# Patient Record
Sex: FEMALE | Race: WHITE | NOT HISPANIC OR LATINO | URBAN - METROPOLITAN AREA
[De-identification: names, ages, dates, MRNs, and addresses within clinical notes are randomized per-mention and may not be internally consistent; named-entity substitution may affect disease eponyms.]

---

## 2017-06-21 ENCOUNTER — APPOINTMENT (OUTPATIENT)
Dept: WOMENS IMAGING | Facility: HOSPITAL | Age: 42
End: 2017-06-21

## 2017-06-21 PROCEDURE — 77067 SCR MAMMO BI INCL CAD: CPT | Performed by: RADIOLOGY

## 2017-08-17 DIAGNOSIS — Z00.00 HEALTH CARE MAINTENANCE: Primary | ICD-10-CM

## 2017-08-19 LAB
25(OH)D3+25(OH)D2 SERPL-MCNC: 30.1 NG/ML (ref 30–100)
ALBUMIN SERPL-MCNC: 4.6 G/DL (ref 3.5–5.2)
ALBUMIN/GLOB SERPL: 1.8 G/DL
ALP SERPL-CCNC: 50 U/L (ref 39–117)
ALT SERPL-CCNC: 14 U/L (ref 1–33)
APPEARANCE UR: CLEAR
AST SERPL-CCNC: 18 U/L (ref 1–32)
BACTERIA #/AREA URNS HPF: NORMAL /HPF
BASOPHILS # BLD AUTO: 0.04 10*3/MM3 (ref 0–0.2)
BASOPHILS NFR BLD AUTO: 0.6 % (ref 0–1.5)
BILIRUB SERPL-MCNC: 1 MG/DL (ref 0.1–1.2)
BILIRUB UR QL STRIP: NEGATIVE
BUN SERPL-MCNC: 9 MG/DL (ref 6–20)
BUN/CREAT SERPL: 11.4 (ref 7–25)
CALCIUM SERPL-MCNC: 9.7 MG/DL (ref 8.6–10.5)
CHLORIDE SERPL-SCNC: 99 MMOL/L (ref 98–107)
CHOLEST SERPL-MCNC: 166 MG/DL (ref 0–200)
CO2 SERPL-SCNC: 26.6 MMOL/L (ref 22–29)
COLOR UR: YELLOW
CREAT SERPL-MCNC: 0.79 MG/DL (ref 0.57–1)
EOSINOPHIL # BLD AUTO: 0.18 10*3/MM3 (ref 0–0.7)
EOSINOPHIL NFR BLD AUTO: 2.8 % (ref 0.3–6.2)
EPI CELLS #/AREA URNS HPF: NORMAL /HPF
ERYTHROCYTE [DISTWIDTH] IN BLOOD BY AUTOMATED COUNT: 12.3 % (ref 11.7–13)
GLOBULIN SER CALC-MCNC: 2.5 GM/DL
GLUCOSE SERPL-MCNC: 78 MG/DL (ref 65–99)
GLUCOSE UR QL: NEGATIVE
HBA1C MFR BLD: 5.2 % (ref 4.8–5.6)
HCT VFR BLD AUTO: 42.3 % (ref 35.6–45.5)
HDLC SERPL-MCNC: 70 MG/DL (ref 40–60)
HGB BLD-MCNC: 14.2 G/DL (ref 11.9–15.5)
HGB UR QL STRIP: NEGATIVE
IMM GRANULOCYTES # BLD: 0 10*3/MM3 (ref 0–0.03)
IMM GRANULOCYTES NFR BLD: 0 % (ref 0–0.5)
KETONES UR QL STRIP: NEGATIVE
LDLC SERPL CALC-MCNC: 68 MG/DL (ref 0–100)
LEUKOCYTE ESTERASE UR QL STRIP: NEGATIVE
LYMPHOCYTES # BLD AUTO: 1.78 10*3/MM3 (ref 0.9–4.8)
LYMPHOCYTES NFR BLD AUTO: 27.4 % (ref 19.6–45.3)
MCH RBC QN AUTO: 30.8 PG (ref 26.9–32)
MCHC RBC AUTO-ENTMCNC: 33.6 G/DL (ref 32.4–36.3)
MCV RBC AUTO: 91.8 FL (ref 80.5–98.2)
MICRO URNS: NORMAL
MICRO URNS: NORMAL
MONOCYTES # BLD AUTO: 0.44 10*3/MM3 (ref 0.2–1.2)
MONOCYTES NFR BLD AUTO: 6.8 % (ref 5–12)
MUCOUS THREADS URNS QL MICRO: PRESENT /HPF
NEUTROPHILS # BLD AUTO: 4.05 10*3/MM3 (ref 1.9–8.1)
NEUTROPHILS NFR BLD AUTO: 62.4 % (ref 42.7–76)
NITRITE UR QL STRIP: NEGATIVE
PH UR STRIP: 6.5 [PH] (ref 5–7.5)
PLATELET # BLD AUTO: 258 10*3/MM3 (ref 140–500)
POTASSIUM SERPL-SCNC: 4 MMOL/L (ref 3.5–5.2)
PROT SERPL-MCNC: 7.1 G/DL (ref 6–8.5)
PROT UR QL STRIP: NEGATIVE
RBC # BLD AUTO: 4.61 10*6/MM3 (ref 3.9–5.2)
RBC #/AREA URNS HPF: NORMAL /HPF
SODIUM SERPL-SCNC: 138 MMOL/L (ref 136–145)
SP GR UR: 1.01 (ref 1–1.03)
TRIGL SERPL-MCNC: 142 MG/DL (ref 0–150)
TSH SERPL DL<=0.005 MIU/L-ACNC: 2.39 MIU/ML (ref 0.27–4.2)
URINALYSIS REFLEX: NORMAL
UROBILINOGEN UR STRIP-MCNC: 0.2 MG/DL (ref 0.2–1)
VLDLC SERPL CALC-MCNC: 28.4 MG/DL (ref 5–40)
WBC # BLD AUTO: 6.49 10*3/MM3 (ref 4.5–10.7)
WBC #/AREA URNS HPF: NORMAL /HPF

## 2017-08-24 ENCOUNTER — OFFICE VISIT (OUTPATIENT)
Dept: INTERNAL MEDICINE | Facility: CLINIC | Age: 42
End: 2017-08-24

## 2017-08-24 VITALS
BODY MASS INDEX: 19.63 KG/M2 | WEIGHT: 115 LBS | OXYGEN SATURATION: 98 % | HEIGHT: 64 IN | HEART RATE: 75 BPM | RESPIRATION RATE: 18 BRPM | SYSTOLIC BLOOD PRESSURE: 104 MMHG | DIASTOLIC BLOOD PRESSURE: 68 MMHG

## 2017-08-24 DIAGNOSIS — Z00.00 HEALTH MAINTENANCE EXAMINATION: Primary | ICD-10-CM

## 2017-08-24 PROCEDURE — 99396 PREV VISIT EST AGE 40-64: CPT | Performed by: INTERNAL MEDICINE

## 2017-08-24 NOTE — PROGRESS NOTES
Chief Complaint  Carmen Francois is a 42 y.o. female who presents for Annual Exam (CPE)  .    History of Present Illness     Mammo:2016, this summer 2017 at [facility]  Pap:, this year at [facility].      Exercise: daily    Immunization History   Administered Date(s) Administered   • Influenza, Quadrivalent 10/15/2014   • Tdap 10/01/2010, 2016       Review of Systems   Constitution: Negative for chills, fever and malaise/fatigue.   HENT: Negative for headaches, hearing loss, hoarse voice and sore throat.    Eyes: Negative for blurred vision, double vision and visual disturbance.   Cardiovascular: Negative for chest pain, leg swelling and palpitations.   Respiratory: Negative for cough and shortness of breath.    Endocrine: Negative for polydipsia and polyuria.   Hematologic/Lymphatic: Does not bruise/bleed easily.   Skin: Negative for rash and suspicious lesions.   Musculoskeletal: Negative for arthritis and myalgias.   Gastrointestinal: Negative for bloating, abdominal pain, change in bowel habit, constipation, diarrhea, dysphagia, hematochezia, melena, nausea and vomiting.   Genitourinary: Negative for dysuria and hematuria.   Neurological: Negative for dizziness and light-headedness.   Psychiatric/Behavioral: Negative for depression. The patient is not nervous/anxious.        There is no problem list on file for this patient.      Past Medical History:   Diagnosis Date   • History of EKG 2014   • Skin lesion        Past Surgical History:   Procedure Laterality Date   •  SECTION     • COLONOSCOPY  2014       Family History   Problem Relation Age of Onset   • Depression Mother    • Lung cancer Paternal Grandmother    • Breast cancer Maternal Grandmother        Social History     Social History   • Marital status:      Spouse name: N/A   • Number of children: N/A   • Years of education: N/A     Occupational History   • Not on file.     Social History Main Topics   •  "Smoking status: Never Smoker   • Smokeless tobacco: Never Used   • Alcohol use Yes      Comment: occasionally   • Drug use: No   • Sexual activity: Defer     Other Topics Concern   • Not on file     Social History Narrative       Current Outpatient Prescriptions on File Prior to Visit   Medication Sig Dispense Refill   • Multiple Vitamin (MULTIVITAMINS PO) Take  by mouth Daily.       No current facility-administered medications on file prior to visit.        No Known Allergies    Vitals:    08/24/17 1104   BP: 104/68   Pulse: 75   Resp: 18   SpO2: 98%   Weight: 115 lb (52.2 kg)   Height: 64\" (162.6 cm)       Body mass index is 19.74 kg/(m^2).    Objective   Physical Exam   Constitutional: She is oriented to person, place, and time. She appears well-developed and well-nourished. No distress.   HENT:   Head: Normocephalic and atraumatic.   Nose: Nose normal.   Mouth/Throat: Oropharynx is clear and moist.   Eyes: Conjunctivae and EOM are normal. Pupils are equal, round, and reactive to light. No scleral icterus.   Neck: Normal range of motion. Neck supple. No thyromegaly present.   Cardiovascular: Normal rate, regular rhythm and normal heart sounds.  Exam reveals no gallop and no friction rub.    No murmur heard.  Pulses:       Carotid pulses are 2+ on the right side, and 2+ on the left side.       Femoral pulses are 2+ on the right side, and 2+ on the left side.       Dorsalis pedis pulses are 2+ on the right side, and 2+ on the left side.        Posterior tibial pulses are 2+ on the right side, and 2+ on the left side.   Pulmonary/Chest: Effort normal and breath sounds normal. No respiratory distress. She has no wheezes. She has no rales. Right breast exhibits no mass and no nipple discharge. Left breast exhibits no mass and no nipple discharge.   Abdominal: Soft. Bowel sounds are normal. She exhibits no distension and no mass. There is no tenderness.   Musculoskeletal: Normal range of motion. She exhibits no edema. "       Vascular Status -  Her exam exhibits no right foot edema. Her exam exhibits no left foot edema.   Skin Integrity  -  Her right foot skin is intact.     Carmen 's left foot skin is intact. .  Lymphadenopathy:     She has no cervical adenopathy.     She has no axillary adenopathy.        Right: No inguinal and no supraclavicular adenopathy present.        Left: No inguinal and no supraclavicular adenopathy present.   Neurological: She is alert and oriented to person, place, and time. She has normal reflexes. No cranial nerve deficit.   Skin: Skin is warm and dry.   Psychiatric: She has a normal mood and affect. Her speech is normal and behavior is normal. Judgment and thought content normal. Cognition and memory are normal.   Vitals reviewed.        Results for orders placed or performed in visit on 08/17/17   Comprehensive Metabolic Panel   Result Value Ref Range    Glucose 78 65 - 99 mg/dL    BUN 9 6 - 20 mg/dL    Creatinine 0.79 0.57 - 1.00 mg/dL    eGFR Non African Am 79 >60 mL/min/1.73    eGFR African Am 96 >60 mL/min/1.73    BUN/Creatinine Ratio 11.4 7.0 - 25.0    Sodium 138 136 - 145 mmol/L    Potassium 4.0 3.5 - 5.2 mmol/L    Chloride 99 98 - 107 mmol/L    Total CO2 26.6 22.0 - 29.0 mmol/L    Calcium 9.7 8.6 - 10.5 mg/dL    Total Protein 7.1 6.0 - 8.5 g/dL    Albumin 4.60 3.50 - 5.20 g/dL    Globulin 2.5 gm/dL    A/G Ratio 1.8 g/dL    Total Bilirubin 1.0 0.1 - 1.2 mg/dL    Alkaline Phosphatase 50 39 - 117 U/L    AST (SGOT) 18 1 - 32 U/L    ALT (SGPT) 14 1 - 33 U/L   Lipid Panel   Result Value Ref Range    Total Cholesterol 166 0 - 200 mg/dL    Triglycerides 142 0 - 150 mg/dL    HDL Cholesterol 70 (H) 40 - 60 mg/dL    VLDL Cholesterol 28.4 5 - 40 mg/dL    LDL Cholesterol  68 0 - 100 mg/dL   TSH Rfx On Abnormal To Free T4   Result Value Ref Range    TSH 2.39 0.27 - 4.2 mIU/mL   UA / M With / Rflx Culture([facility] ONLY)   Result Value Ref Range    Specific Gravity, UA 1.006 1.005 - 1.030    pH, UA 6.5 5.0  - 7.5    Color, UA Yellow Yellow    Appearance, UA Clear Clear    Leukocytes, UA Negative Negative    Protein Negative Negative/Trace    Glucose, UA Negative Negative    Ketones Negative Negative    Blood, UA Negative Negative    Bilirubin, UA Negative Negative    Urobilinogen, UA 0.2 0.2 - 1.0 mg/dL    Nitrite, UA Negative Negative    Microscopic Examination Comment     MICROSCOPIC EXAMINATION See below:     Urinalysis Reflex Comment    Vitamin D 25 Hydroxy   Result Value Ref Range    25 Hydroxy, Vitamin D 30.1 30.0 - 100.0 ng/mL   Hemoglobin A1c   Result Value Ref Range    Hemoglobin A1C 5.20 4.80 - 5.60 %   Microscopic Examination   Result Value Ref Range    WBC, UA None seen 0 - 5 /hpf    RBC, UA 0-2 0 - 2 /hpf    Epithelial Cells (non renal) 0-10 0 - 10 /hpf    Mucus, UA Present Not Estab. /hpf    Bacteria, UA Few None seen/Few /hpf   CBC & Differential   Result Value Ref Range    WBC 6.49 4.50 - 10.70 10*3/mm3    RBC 4.61 3.90 - 5.20 10*6/mm3    Hemoglobin 14.2 11.9 - 15.5 g/dL    Hematocrit 42.3 35.6 - 45.5 %    MCV 91.8 80.5 - 98.2 fL    MCH 30.8 26.9 - 32.0 pg    MCHC 33.6 32.4 - 36.3 g/dL    RDW 12.3 11.7 - 13.0 %    Platelets 258 140 - 500 10*3/mm3    Neutrophil Rel % 62.4 42.7 - 76.0 %    Lymphocyte Rel % 27.4 19.6 - 45.3 %    Monocyte Rel % 6.8 5.0 - 12.0 %    Eosinophil Rel % 2.8 0.3 - 6.2 %    Basophil Rel % 0.6 0.0 - 1.5 %    Neutrophils Absolute 4.05 1.90 - 8.10 10*3/mm3    Lymphocytes Absolute 1.78 0.90 - 4.80 10*3/mm3    Monocytes Absolute 0.44 0.20 - 1.20 10*3/mm3    Eosinophils Absolute 0.18 0.00 - 0.70 10*3/mm3    Basophils Absolute 0.04 0.00 - 0.20 10*3/mm3    Immature Granulocyte Rel % 0.0 0.0 - 0.5 %    Immature Grans Absolute 0.00 0.00 - 0.03 10*3/mm3       Assessment/Plan   Diagnoses and all orders for this visit:    Health maintenance examination        Discussion/Summary    Rekha is a very healthy 41 y/o here for routine CPE.  She is up to date on health maintenance.  Her labs all look  good.  I will see her back in a year.      Return in about 1 year (around 8/24/2018) for Annual physical, with fasting labs prior.

## 2018-08-17 DIAGNOSIS — Z00.00 HEALTH CARE MAINTENANCE: Primary | ICD-10-CM

## 2018-08-22 LAB
25(OH)D3+25(OH)D2 SERPL-MCNC: 29.6 NG/ML (ref 30–100)
ALBUMIN SERPL-MCNC: 4.5 G/DL (ref 3.5–5.5)
ALBUMIN/GLOB SERPL: 2 {RATIO} (ref 1.2–2.2)
ALP SERPL-CCNC: 57 IU/L (ref 39–117)
ALT SERPL-CCNC: 11 IU/L (ref 0–32)
APPEARANCE UR: CLEAR
AST SERPL-CCNC: 16 IU/L (ref 0–40)
BACTERIA #/AREA URNS HPF: NORMAL /[HPF]
BASOPHILS # BLD AUTO: 0.1 X10E3/UL (ref 0–0.2)
BASOPHILS NFR BLD AUTO: 1 %
BILIRUB SERPL-MCNC: 0.8 MG/DL (ref 0–1.2)
BILIRUB UR QL STRIP: NEGATIVE
BUN SERPL-MCNC: 11 MG/DL (ref 6–24)
BUN/CREAT SERPL: 14 (ref 9–23)
CALCIUM SERPL-MCNC: 9.3 MG/DL (ref 8.7–10.2)
CHLORIDE SERPL-SCNC: 99 MMOL/L (ref 96–106)
CHOLEST SERPL-MCNC: 172 MG/DL (ref 100–199)
CO2 SERPL-SCNC: 24 MMOL/L (ref 20–29)
COLOR UR: YELLOW
CREAT SERPL-MCNC: 0.77 MG/DL (ref 0.57–1)
EOSINOPHIL # BLD AUTO: 0.2 X10E3/UL (ref 0–0.4)
EOSINOPHIL NFR BLD AUTO: 3 %
EPI CELLS #/AREA URNS HPF: NORMAL /HPF
ERYTHROCYTE [DISTWIDTH] IN BLOOD BY AUTOMATED COUNT: 12.6 % (ref 12.3–15.4)
GLOBULIN SER CALC-MCNC: 2.3 G/DL (ref 1.5–4.5)
GLUCOSE SERPL-MCNC: 86 MG/DL (ref 65–99)
GLUCOSE UR QL: NEGATIVE
HBA1C MFR BLD: 5.1 % (ref 4.8–5.6)
HCT VFR BLD AUTO: 40.6 % (ref 34–46.6)
HDLC SERPL-MCNC: 70 MG/DL
HGB BLD-MCNC: 13.9 G/DL (ref 11.1–15.9)
HGB UR QL STRIP: NEGATIVE
IMM GRANULOCYTES # BLD: 0 X10E3/UL (ref 0–0.1)
IMM GRANULOCYTES NFR BLD: 0 %
KETONES UR QL STRIP: NEGATIVE
LDLC SERPL CALC-MCNC: 77 MG/DL (ref 0–99)
LEUKOCYTE ESTERASE UR QL STRIP: NEGATIVE
LYMPHOCYTES # BLD AUTO: 1.7 X10E3/UL (ref 0.7–3.1)
LYMPHOCYTES NFR BLD AUTO: 31 %
MCH RBC QN AUTO: 30.7 PG (ref 26.6–33)
MCHC RBC AUTO-ENTMCNC: 34.2 G/DL (ref 31.5–35.7)
MCV RBC AUTO: 90 FL (ref 79–97)
MICRO URNS: NORMAL
MICRO URNS: NORMAL
MONOCYTES # BLD AUTO: 0.4 X10E3/UL (ref 0.1–0.9)
MONOCYTES NFR BLD AUTO: 8 %
NEUTROPHILS # BLD AUTO: 3 X10E3/UL (ref 1.4–7)
NEUTROPHILS NFR BLD AUTO: 57 %
NITRITE UR QL STRIP: NEGATIVE
PH UR STRIP: 6.5 [PH] (ref 5–7.5)
PLATELET # BLD AUTO: 273 X10E3/UL (ref 150–379)
POTASSIUM SERPL-SCNC: 4.5 MMOL/L (ref 3.5–5.2)
PROT SERPL-MCNC: 6.8 G/DL (ref 6–8.5)
PROT UR QL STRIP: NEGATIVE
RBC # BLD AUTO: 4.53 X10E6/UL (ref 3.77–5.28)
RBC #/AREA URNS HPF: NORMAL /HPF
SODIUM SERPL-SCNC: 139 MMOL/L (ref 134–144)
SP GR UR: 1.01 (ref 1–1.03)
TRIGL SERPL-MCNC: 125 MG/DL (ref 0–149)
TSH SERPL DL<=0.005 MIU/L-ACNC: 3.36 UIU/ML (ref 0.45–4.5)
URINALYSIS REFLEX: NORMAL
UROBILINOGEN UR STRIP-MCNC: 0.2 MG/DL (ref 0.2–1)
VLDLC SERPL CALC-MCNC: 25 MG/DL (ref 5–40)
WBC # BLD AUTO: 5.3 X10E3/UL (ref 3.4–10.8)
WBC #/AREA URNS HPF: NORMAL /HPF

## 2018-08-28 ENCOUNTER — OFFICE VISIT (OUTPATIENT)
Dept: INTERNAL MEDICINE | Facility: CLINIC | Age: 43
End: 2018-08-28

## 2018-08-28 VITALS
HEIGHT: 64 IN | RESPIRATION RATE: 18 BRPM | HEART RATE: 88 BPM | DIASTOLIC BLOOD PRESSURE: 64 MMHG | OXYGEN SATURATION: 99 % | BODY MASS INDEX: 19.81 KG/M2 | WEIGHT: 116 LBS | SYSTOLIC BLOOD PRESSURE: 98 MMHG

## 2018-08-28 DIAGNOSIS — Z00.00 HEALTH MAINTENANCE EXAMINATION: Primary | ICD-10-CM

## 2018-08-28 PROCEDURE — 99396 PREV VISIT EST AGE 40-64: CPT | Performed by: INTERNAL MEDICINE

## 2018-08-28 NOTE — PROGRESS NOTES
Chief Complaint  Carmen Francois is a 43 y.o. female who presents for Annual Exam (CPE)  .    History of Present Illness   Carmen is here for routine CPE.  No new concerns.      Mammo: Is due this year, knows she is due. Scheduled in a couple weeks.   Pap: , gets this same time as mammo.   She had a c-scope at 39 in  with Dr. Andrea.    Exercise: Walks her dog daily, jogs sometimes.     Immunization History   Administered Date(s) Administered   • Hepatitis A 2018   • Influenza, Quadrivalent 10/15/2014   • Tdap 10/01/2010, 2016       Review of Systems   Constitution: Negative for chills, fever and malaise/fatigue.   HENT: Negative for hearing loss, hoarse voice and sore throat.    Eyes: Negative for blurred vision, double vision and visual disturbance.   Cardiovascular: Negative for chest pain, leg swelling and palpitations.   Respiratory: Negative for cough and shortness of breath.    Endocrine: Negative for polydipsia and polyuria.   Hematologic/Lymphatic: Does not bruise/bleed easily.   Skin: Negative for rash and suspicious lesions.   Musculoskeletal: Negative for arthritis and myalgias.   Gastrointestinal: Negative for bloating, abdominal pain, change in bowel habit, constipation, diarrhea, dysphagia, hematochezia, melena, nausea and vomiting.   Genitourinary: Negative for dysuria and hematuria.   Neurological: Negative for dizziness, headaches and light-headedness.   Psychiatric/Behavioral: Negative for depression. The patient is not nervous/anxious.        There is no problem list on file for this patient.      Past Medical History:   Diagnosis Date   • History of EKG 2014   • Skin lesion        Past Surgical History:   Procedure Laterality Date   •  SECTION     • COLONOSCOPY  2014       Family History   Problem Relation Age of Onset   • Depression Mother    • Lung cancer Paternal Grandmother    • Breast cancer Maternal Grandmother        Social History  "    Social History   • Marital status:      Spouse name: N/A   • Number of children: 2   • Years of education: N/A     Occupational History   • Not on file.     Social History Main Topics   • Smoking status: Never Smoker   • Smokeless tobacco: Never Used   • Alcohol use Yes      Comment: occasionally   • Drug use: No   • Sexual activity: Defer     Other Topics Concern   • Not on file     Social History Narrative   • No narrative on file       Current Outpatient Prescriptions on File Prior to Visit   Medication Sig Dispense Refill   • Multiple Vitamin (MULTIVITAMINS PO) Take  by mouth Daily.       No current facility-administered medications on file prior to visit.        No Known Allergies    Vitals:    08/28/18 1258   BP: 98/64   Pulse: 88   Resp: 18   SpO2: 99%   Weight: 52.6 kg (116 lb)   Height: 162.6 cm (64.02\")       Body mass index is 19.9 kg/m².    Objective   Physical Exam   Constitutional: She is oriented to person, place, and time. She appears well-developed and well-nourished. No distress.   HENT:   Head: Normocephalic and atraumatic.   Nose: Nose normal.   Mouth/Throat: Oropharynx is clear and moist.   Eyes: Pupils are equal, round, and reactive to light. Conjunctivae and EOM are normal. No scleral icterus.   Neck: Normal range of motion. Neck supple. No thyromegaly present.   Cardiovascular: Normal rate, regular rhythm and normal heart sounds.  Exam reveals no gallop and no friction rub.    No murmur heard.  Pulses:       Carotid pulses are 2+ on the right side, and 2+ on the left side.       Femoral pulses are 2+ on the right side, and 2+ on the left side.       Dorsalis pedis pulses are 2+ on the right side, and 2+ on the left side.        Posterior tibial pulses are 2+ on the right side, and 2+ on the left side.   Pulmonary/Chest: Effort normal and breath sounds normal. No respiratory distress. She has no wheezes. She has no rales. Right breast exhibits no mass and no nipple discharge. Left " breast exhibits no mass and no nipple discharge.   Abdominal: Soft. Bowel sounds are normal. She exhibits no distension and no mass. There is no tenderness.   Musculoskeletal: Normal range of motion. She exhibits no edema.     Vascular Status -  Her right foot exhibits no edema. Her left foot exhibits no edema.  Skin Integrity  -  Her right foot skin is intact.Her left foot skin is intact..  Lymphadenopathy:     She has no cervical adenopathy.     She has no axillary adenopathy.        Right: No inguinal and no supraclavicular adenopathy present.        Left: No inguinal and no supraclavicular adenopathy present.   Neurological: She is alert and oriented to person, place, and time. She has normal reflexes. No cranial nerve deficit.   Skin: Skin is warm and dry.   Psychiatric: She has a normal mood and affect. Her speech is normal and behavior is normal. Judgment and thought content normal. Cognition and memory are normal.   Vitals reviewed.        Results for orders placed or performed in visit on 08/17/18   Comprehensive Metabolic Panel   Result Value Ref Range    Glucose 86 65 - 99 mg/dL    BUN 11 6 - 24 mg/dL    Creatinine 0.77 0.57 - 1.00 mg/dL    eGFR Non African Am 94 >59 mL/min/1.73    eGFR African Am 109 >59 mL/min/1.73    BUN/Creatinine Ratio 14 9 - 23    Sodium 139 134 - 144 mmol/L    Potassium 4.5 3.5 - 5.2 mmol/L    Chloride 99 96 - 106 mmol/L    Total CO2 24 20 - 29 mmol/L    Calcium 9.3 8.7 - 10.2 mg/dL    Total Protein 6.8 6.0 - 8.5 g/dL    Albumin 4.5 3.5 - 5.5 g/dL    Globulin 2.3 1.5 - 4.5 g/dL    A/G Ratio 2.0 1.2 - 2.2    Total Bilirubin 0.8 0.0 - 1.2 mg/dL    Alkaline Phosphatase 57 39 - 117 IU/L    AST (SGOT) 16 0 - 40 IU/L    ALT (SGPT) 11 0 - 32 IU/L   Lipid Panel   Result Value Ref Range    Total Cholesterol 172 100 - 199 mg/dL    Triglycerides 125 0 - 149 mg/dL    HDL Cholesterol 70 >39 mg/dL    VLDL Cholesterol 25 5 - 40 mg/dL    LDL Cholesterol  77 0 - 99 mg/dL   TSH Rfx On Abnormal To  Free T4   Result Value Ref Range    TSH 3.360 0.450 - 4.500 uIU/mL   Vitamin D 25 Hydroxy   Result Value Ref Range    25 Hydroxy, Vitamin D 29.6 (L) 30.0 - 100.0 ng/mL   Hemoglobin A1c   Result Value Ref Range    Hemoglobin A1C 5.1 4.8 - 5.6 %   Urinalysis With Culture If Indicated - Urine, Clean Catch   Result Value Ref Range    Specific Gravity, UA 1.011 1.005 - 1.030    pH, UA 6.5 5.0 - 7.5    Color, UA Yellow Yellow    Appearance, UA Clear Clear    Leukocytes, UA Negative Negative    Protein Negative Negative/Trace    Glucose, UA Negative Negative    Ketones Negative Negative    Blood, UA Negative Negative    Bilirubin, UA Negative Negative    Urobilinogen, UA 0.2 0.2 - 1.0 mg/dL    Nitrite, UA Negative Negative    Microscopic Examination Comment     MICROSCOPIC EXAMINATION See below:     Urinalysis Reflex Comment    Microscopic Examination   Result Value Ref Range    WBC, UA 0-5 0 - 5 /hpf    RBC, UA None seen 0 - 2 /hpf    Epithelial Cells (non renal) 0-10 0 - 10 /hpf    Bacteria, UA None seen None seen/Few   CBC & Differential   Result Value Ref Range    WBC 5.3 3.4 - 10.8 x10E3/uL    RBC 4.53 3.77 - 5.28 x10E6/uL    Hemoglobin 13.9 11.1 - 15.9 g/dL    Hematocrit 40.6 34.0 - 46.6 %    MCV 90 79 - 97 fL    MCH 30.7 26.6 - 33.0 pg    MCHC 34.2 31.5 - 35.7 g/dL    RDW 12.6 12.3 - 15.4 %    Platelets 273 150 - 379 x10E3/uL    Neutrophil Rel % 57 Not Estab. %    Lymphocyte Rel % 31 Not Estab. %    Monocyte Rel % 8 Not Estab. %    Eosinophil Rel % 3 Not Estab. %    Basophil Rel % 1 Not Estab. %    Neutrophils Absolute 3.0 1.4 - 7.0 x10E3/uL    Lymphocytes Absolute 1.7 0.7 - 3.1 x10E3/uL    Monocytes Absolute 0.4 0.1 - 0.9 x10E3/uL    Eosinophils Absolute 0.2 0.0 - 0.4 x10E3/uL    Basophils Absolute 0.1 0.0 - 0.2 x10E3/uL    Immature Granulocyte Rel % 0 Not Estab. %    Immature Grans Absolute 0.0 0.0 - 0.1 x10E3/uL       Assessment/Plan   Diagnoses and all orders for this visit:    Health maintenance  examination        Discussion/Summary  Carmen is here for routine CPE.  She is a very healthy 44 y/o. She's up to date on health maintenance.  [facility] does her GYN exams.  Her labs all look great.  I will see her back in a year.     Return in about 1 year (around 8/28/2019) for Annual physical, with fasting labs prior.

## 2018-09-13 ENCOUNTER — APPOINTMENT (OUTPATIENT)
Dept: WOMENS IMAGING | Facility: HOSPITAL | Age: 43
End: 2018-09-13

## 2018-09-13 ENCOUNTER — OFFICE VISIT (OUTPATIENT)
Dept: INTERNAL MEDICINE | Facility: CLINIC | Age: 43
End: 2018-09-13

## 2018-09-13 VITALS
HEIGHT: 64 IN | BODY MASS INDEX: 19.81 KG/M2 | DIASTOLIC BLOOD PRESSURE: 74 MMHG | HEART RATE: 79 BPM | RESPIRATION RATE: 18 BRPM | WEIGHT: 116 LBS | OXYGEN SATURATION: 100 % | SYSTOLIC BLOOD PRESSURE: 118 MMHG

## 2018-09-13 DIAGNOSIS — R10.30 LOWER ABDOMINAL PAIN: Primary | ICD-10-CM

## 2018-09-13 LAB
B-HCG UR QL: NEGATIVE
BILIRUB BLD-MCNC: NEGATIVE MG/DL
CLARITY, POC: CLEAR
COLOR UR: YELLOW
GLUCOSE UR STRIP-MCNC: NEGATIVE MG/DL
HCT VFR BLDA CALC: 40.2 %
HGB BLDA-MCNC: 13.3 G/DL
INTERNAL NEGATIVE CONTROL: POSITIVE
INTERNAL POSITIVE CONTROL: NEGATIVE
KETONES UR QL: NEGATIVE
LEUKOCYTE EST, POC: NEGATIVE
LYMPHOCYTES # BLD: 21.8 %
Lab: NORMAL
MCH, POC: 30.3
MCHC, POC: 33.1
MCV, POC: 91.4
MONOCYTES # BLD: 8.7 %
NITRITE UR-MCNC: NEGATIVE MG/ML
PH UR: 6 [PH] (ref 5–8)
PLATELET # BLD: 235 10*3/MM3
PMV BLD: 7.4 FL
POC NEUTROPHIL: 69.5 %
PROT UR STRIP-MCNC: NEGATIVE MG/DL
RBC # UR STRIP: NEGATIVE /UL
RBC, POC: 4.4
RDW, POC: 12.3
SP GR UR: 1.01 (ref 1–1.03)
UROBILINOGEN UR QL: NORMAL
WBC # BLD: 6 10*3/UL

## 2018-09-13 PROCEDURE — 77063 BREAST TOMOSYNTHESIS BI: CPT | Performed by: RADIOLOGY

## 2018-09-13 PROCEDURE — 85025 COMPLETE CBC W/AUTO DIFF WBC: CPT | Performed by: INTERNAL MEDICINE

## 2018-09-13 PROCEDURE — 81025 URINE PREGNANCY TEST: CPT | Performed by: INTERNAL MEDICINE

## 2018-09-13 PROCEDURE — 99214 OFFICE O/P EST MOD 30 MIN: CPT | Performed by: INTERNAL MEDICINE

## 2018-09-13 PROCEDURE — 77067 SCR MAMMO BI INCL CAD: CPT | Performed by: RADIOLOGY

## 2018-09-13 PROCEDURE — 81003 URINALYSIS AUTO W/O SCOPE: CPT | Performed by: INTERNAL MEDICINE

## 2018-09-13 NOTE — PROGRESS NOTES
Chief Complaint  Carmen Francois is a 43 y.o. female who presents for Abdominal Pain (Cramping, her period is normally around the 4th or 5th, it is late, her periods are always very regular. ) and Back Pain  .    History of Present Illness   Carmen is here for acute care for new issues.  She thought she was going to get her period on Aug 4 or .  She was sore in her lower abd. She still has not gotten her period.  The pain in her lower abd and now her back is getting worse.  No nausea.  No change in bms.  No dysuria.   She took a pregnancy test yesterday that was negative.  She is normally very regular.  No loss of appetite.      Review of Systems   Constitution: Negative for chills and fever.   Gastrointestinal: Positive for abdominal pain. Negative for diarrhea, hematochezia, melena, nausea and vomiting.   Genitourinary: Negative for dysuria and hematuria.       There is no problem list on file for this patient.      Past Medical History:   Diagnosis Date   • History of EKG 2014   • Skin lesion        Past Surgical History:   Procedure Laterality Date   •  SECTION     • COLONOSCOPY  2014       Family History   Problem Relation Age of Onset   • Depression Mother    • Lung cancer Paternal Grandmother    • Breast cancer Maternal Grandmother        Social History     Social History   • Marital status:      Spouse name: N/A   • Number of children: 2   • Years of education: N/A     Occupational History   • Not on file.     Social History Main Topics   • Smoking status: Never Smoker   • Smokeless tobacco: Never Used   • Alcohol use Yes      Comment: occasionally   • Drug use: No   • Sexual activity: Defer     Other Topics Concern   • Not on file     Social History Narrative   • No narrative on file       Current Outpatient Prescriptions on File Prior to Visit   Medication Sig Dispense Refill   • Multiple Vitamin (MULTIVITAMINS PO) Take  by mouth Daily.       No current  "facility-administered medications on file prior to visit.        No Known Allergies    Vitals:    09/13/18 1006   BP: 118/74   Pulse: 79   Resp: 18   SpO2: 100%   Weight: 52.6 kg (116 lb)   Height: 162.6 cm (64.02\")       Body mass index is 19.9 kg/m².    Objective   Physical Exam   Constitutional: She appears well-developed and well-nourished. No distress.   HENT:   Head: Normocephalic and atraumatic.   Eyes: Conjunctivae are normal. No scleral icterus.   Neck: Normal range of motion. Neck supple.   Cardiovascular: Normal rate, regular rhythm and normal heart sounds.    No murmur heard.  Pulmonary/Chest: Effort normal and breath sounds normal. No respiratory distress.   Abdominal: Soft. Bowel sounds are decreased. There is tenderness in the right lower quadrant and left lower quadrant. There is rebound and tenderness at McBurney's point. There is no rigidity and no guarding.   Lymphadenopathy:     She has no cervical adenopathy.       Results for orders placed or performed in visit on 09/13/18   POC CBC With / Auto Diff   Result Value Ref Range    WBC 6.0     RBC 4.40     Hemoglobin 13.3 g/dL    Hematocrit 40.2 %    MCV 91.4     MCH 30.3     MCHC 33.1     RDW-CV 12.3     MPV 7.4     Platelets 235 10*3/mm3    Neutrophil Rel % 69.5 %    Monocyte Rel % 8.7 %    Lymphocyte Rel % 21.8 %   POC Urinalysis Dipstick, Automated   Result Value Ref Range    Color Yellow Yellow, Straw, Dark Yellow, Jennifer    Clarity, UA Clear Clear    Specific Gravity  1.015 1.005 - 1.030    pH, Urine 6.0 5.0 - 8.0    Leukocytes Negative Negative    Nitrite, UA Negative Negative    Protein, POC Negative Negative mg/dL    Glucose, UA Negative Negative, 1000 mg/dL (3+) mg/dL    Ketones, UA Negative Negative    Urobilinogen, UA Normal Normal    Bilirubin Negative Negative    Blood, UA Negative Negative   POC Pregnancy, Urine   Result Value Ref Range    HCG, Urine, QL Negative Negative    Lot Number [id number]     Internal Positive Control Negative  "    Internal Negative Control Positive        Assessment/Plan   Diagnoses and all orders for this visit:    Lower abdominal pain  -     POC CBC With / Auto Diff  -     POC Urinalysis Dipstick, Automated  -     POC Pregnancy, Urine  -     CT Abdomen Pelvis With Contrast        Discussion/Summary  Carmen is here for acute care for a new and worsening issue.  Her exam is pretty concerning for appendicitis despite a normal WBC.  She has rebound tenderness and is most tender on exam in the RLQ.  I am sending her for a stat CT for further evaluation.     No Follow-up on file.

## 2018-09-18 ENCOUNTER — TELEPHONE (OUTPATIENT)
Dept: INTERNAL MEDICINE | Facility: CLINIC | Age: 43
End: 2018-09-18

## 2018-09-18 NOTE — TELEPHONE ENCOUNTER
I hate to have her come back in, but I feel like it would be helpful since we focused so much on her abd pain at the last rachel.

## 2018-09-18 NOTE — TELEPHONE ENCOUNTER
Pt called and stated that her back pain is still there and worsening slightly, wants to know if she needs to come in for another ov or maybe a referral to a specialist, would like to know what else can be done.

## 2019-10-08 ENCOUNTER — APPOINTMENT (OUTPATIENT)
Dept: WOMENS IMAGING | Facility: HOSPITAL | Age: 44
End: 2019-10-08

## 2019-10-08 PROCEDURE — 77067 SCR MAMMO BI INCL CAD: CPT | Performed by: RADIOLOGY

## 2019-10-08 PROCEDURE — 77063 BREAST TOMOSYNTHESIS BI: CPT | Performed by: RADIOLOGY

## 2019-11-04 DIAGNOSIS — Z00.00 HEALTH MAINTENANCE EXAMINATION: Primary | ICD-10-CM

## 2019-11-05 ENCOUNTER — OFFICE VISIT (OUTPATIENT)
Dept: INTERNAL MEDICINE | Facility: CLINIC | Age: 44
End: 2019-11-05

## 2019-11-05 VITALS
SYSTOLIC BLOOD PRESSURE: 126 MMHG | HEIGHT: 65 IN | WEIGHT: 113 LBS | HEART RATE: 84 BPM | OXYGEN SATURATION: 99 % | BODY MASS INDEX: 18.83 KG/M2 | DIASTOLIC BLOOD PRESSURE: 78 MMHG

## 2019-11-05 DIAGNOSIS — Z00.00 HEALTHCARE MAINTENANCE: Primary | ICD-10-CM

## 2019-11-05 LAB
ALBUMIN SERPL-MCNC: 4.9 G/DL (ref 3.5–5.5)
ALBUMIN/GLOB SERPL: 2.5 {RATIO} (ref 1.2–2.2)
ALP SERPL-CCNC: 53 IU/L (ref 39–117)
ALT SERPL-CCNC: 20 IU/L (ref 0–32)
APPEARANCE UR: CLEAR
AST SERPL-CCNC: 21 IU/L (ref 0–40)
BACTERIA #/AREA URNS HPF: NORMAL /[HPF]
BASOPHILS # BLD AUTO: 0 X10E3/UL (ref 0–0.2)
BASOPHILS NFR BLD AUTO: 1 %
BILIRUB SERPL-MCNC: 0.9 MG/DL (ref 0–1.2)
BILIRUB UR QL STRIP: NEGATIVE
BUN SERPL-MCNC: 12 MG/DL (ref 6–24)
BUN/CREAT SERPL: 17 (ref 9–23)
CALCIUM SERPL-MCNC: 9.7 MG/DL (ref 8.7–10.2)
CHLORIDE SERPL-SCNC: 101 MMOL/L (ref 96–106)
CHOLEST SERPL-MCNC: 192 MG/DL (ref 100–199)
CO2 SERPL-SCNC: 22 MMOL/L (ref 20–29)
COLOR UR: YELLOW
CREAT SERPL-MCNC: 0.7 MG/DL (ref 0.57–1)
EOSINOPHIL # BLD AUTO: 0.1 X10E3/UL (ref 0–0.4)
EOSINOPHIL NFR BLD AUTO: 2 %
EPI CELLS #/AREA URNS HPF: NORMAL /HPF
ERYTHROCYTE [DISTWIDTH] IN BLOOD BY AUTOMATED COUNT: 12.6 % (ref 12.3–15.4)
GLOBULIN SER CALC-MCNC: 2 G/DL (ref 1.5–4.5)
GLUCOSE SERPL-MCNC: 110 MG/DL (ref 65–99)
GLUCOSE UR QL: NEGATIVE
HCT VFR BLD AUTO: 40.8 % (ref 34–46.6)
HDLC SERPL-MCNC: 68 MG/DL
HGB BLD-MCNC: 13.8 G/DL (ref 11.1–15.9)
HGB UR QL STRIP: NEGATIVE
IMM GRANULOCYTES # BLD AUTO: 0 X10E3/UL (ref 0–0.1)
IMM GRANULOCYTES NFR BLD AUTO: 0 %
KETONES UR QL STRIP: NEGATIVE
LDLC SERPL CALC-MCNC: 79 MG/DL (ref 0–99)
LEUKOCYTE ESTERASE UR QL STRIP: NEGATIVE
LYMPHOCYTES # BLD AUTO: 1.4 X10E3/UL (ref 0.7–3.1)
LYMPHOCYTES NFR BLD AUTO: 26 %
MCH RBC QN AUTO: 29.7 PG (ref 26.6–33)
MCHC RBC AUTO-ENTMCNC: 33.8 G/DL (ref 31.5–35.7)
MCV RBC AUTO: 88 FL (ref 79–97)
MICRO URNS: NORMAL
MICRO URNS: NORMAL
MONOCYTES # BLD AUTO: 0.4 X10E3/UL (ref 0.1–0.9)
MONOCYTES NFR BLD AUTO: 7 %
NEUTROPHILS # BLD AUTO: 3.5 X10E3/UL (ref 1.4–7)
NEUTROPHILS NFR BLD AUTO: 64 %
NITRITE UR QL STRIP: NEGATIVE
PH UR STRIP: 6.5 [PH] (ref 5–7.5)
PLATELET # BLD AUTO: 286 X10E3/UL (ref 150–450)
POTASSIUM SERPL-SCNC: 4.2 MMOL/L (ref 3.5–5.2)
PROT SERPL-MCNC: 6.9 G/DL (ref 6–8.5)
PROT UR QL STRIP: NEGATIVE
RBC # BLD AUTO: 4.64 X10E6/UL (ref 3.77–5.28)
RBC #/AREA URNS HPF: NORMAL /HPF
SODIUM SERPL-SCNC: 140 MMOL/L (ref 134–144)
SP GR UR: 1 (ref 1–1.03)
TRIGL SERPL-MCNC: 225 MG/DL (ref 0–149)
TSH SERPL DL<=0.005 MIU/L-ACNC: 2.96 UIU/ML (ref 0.45–4.5)
URINALYSIS REFLEX: NORMAL
UROBILINOGEN UR STRIP-MCNC: 0.2 MG/DL (ref 0.2–1)
VLDLC SERPL CALC-MCNC: 45 MG/DL (ref 5–40)
WBC # BLD AUTO: 5.4 X10E3/UL (ref 3.4–10.8)
WBC #/AREA URNS HPF: NORMAL /HPF

## 2019-11-05 PROCEDURE — 99396 PREV VISIT EST AGE 40-64: CPT | Performed by: INTERNAL MEDICINE

## 2019-11-05 NOTE — PROGRESS NOTES
Subjective   CPE    Carmen Francois is a 44 y.o. female who presents for a complete physical exam.  She is a new patient to me, prior patient of Dr. Leigha Waterman. She is doing well today. She does note am loose stooling. This is almost directly related to her morning coffee. She does use creamer in her coffee and this seems to worsen the effects.       Review of Systems   Constitutional: Negative.    HENT: Negative.    Eyes: Negative.    Respiratory: Negative.    Cardiovascular: Negative.    Gastrointestinal: Negative.    Endocrine: Negative.    Genitourinary: Negative.    Musculoskeletal: Negative.    Skin: Negative.    Allergic/Immunologic: Negative.    Neurological: Negative.    Hematological: Negative.    Psychiatric/Behavioral: Negative.        The following portions of the patient's history were reviewed and updated as appropriate: allergies, current medications, past family history, past medical history, past social history, past surgical history and problem list.     There is no problem list on file for this patient.      Past Medical History:   Diagnosis Date   • History of EKG 2014   • Skin lesion        Past Surgical History:   Procedure Laterality Date   •  SECTION     • COLONOSCOPY  2014    Granular mucosa in the distal rectum, non-bleeding internal hemorrhoids       Family History   Problem Relation Age of Onset   • Depression Mother    • Lung cancer Paternal Grandmother    • Breast cancer Maternal Grandmother        Social History     Socioeconomic History   • Marital status:      Spouse name: Not on file   • Number of children: 2   • Years of education: Not on file   • Highest education level: Not on file   Tobacco Use   • Smoking status: Never Smoker   • Smokeless tobacco: Never Used   Substance and Sexual Activity   • Alcohol use: Yes     Comment: occasionally   • Drug use: No   • Sexual activity: Defer       Current Outpatient Medications on File Prior to Visit  "  Medication Sig Dispense Refill   • Multiple Vitamin (MULTIVITAMINS PO) Take  by mouth Daily.       No current facility-administered medications on file prior to visit.        No Known Allergies    Immunization History   Administered Date(s) Administered   • Flu Mist 10/15/2014   • Flu Vaccine Quad PF >18YRS 11/05/2018, 10/01/2019   • Hepatitis A 06/08/2018, 03/13/2019   • Tdap 10/01/2010, 11/22/2016       Objective     /78   Pulse 84   Ht 163.8 cm (64.5\")   Wt 51.3 kg (113 lb)   SpO2 99%   BMI 19.10 kg/m²     Physical Exam   Constitutional: She is oriented to person, place, and time. She appears well-developed and well-nourished.   HENT:   Head: Normocephalic and atraumatic.   Right Ear: External ear normal.   Left Ear: External ear normal.   Nose: Nose normal.   Mouth/Throat: Oropharynx is clear and moist.   Eyes: Conjunctivae and EOM are normal. Pupils are equal, round, and reactive to light.   Neck: Normal range of motion. Neck supple.   Cardiovascular: Normal rate, regular rhythm, normal heart sounds and intact distal pulses.   Pulmonary/Chest: Effort normal and breath sounds normal. No respiratory distress.   Abdominal: Soft. Bowel sounds are normal.   Musculoskeletal: Normal range of motion.   Neurological: She is alert and oriented to person, place, and time.   Skin: Skin is warm and dry.   Psychiatric: She has a normal mood and affect. Her behavior is normal. Judgment and thought content normal.   Nursing note and vitals reviewed.      Assessment/Plan   Carmen was seen today for annual exam.    Diagnoses and all orders for this visit:    Healthcare maintenance        Discussion    Patient presents today for a CPE.  Patient follows a healthy diet.   Patient follows an adequate exercise regimen. Mammogram is up to date.   Pap smears are performed by the patient's gynecologist.   Immunizations are up to date.  She has loose stooling in the morning that by report is related to coffee w/ creamer. " Suggested alternatives to this. She can do an elimination trial and if symptoms resolve w/out coffee/ creamer would def consider alternative. Labs were nonfasting and reviewed and are wnl. She will continue routine hcm and f/u in one year or prn.          Future Appointments   Date Time Provider Department Center   11/5/2020  8:00 AM [facility] None   11/10/2020 10:45 AM Elena Sal MD [facility] None

## 2019-11-07 ENCOUNTER — TELEPHONE (OUTPATIENT)
Dept: GASTROENTEROLOGY | Facility: CLINIC | Age: 44
End: 2019-11-07

## 2019-11-07 ENCOUNTER — TELEPHONE (OUTPATIENT)
Dept: INTERNAL MEDICINE | Facility: CLINIC | Age: 44
End: 2019-11-07

## 2019-11-07 DIAGNOSIS — Z12.11 COLON CANCER SCREENING: Primary | ICD-10-CM

## 2019-11-07 NOTE — TELEPHONE ENCOUNTER
Recommend 5-year follow-up due to hyperplastic polyp in the transverse colon-last C scope was 2014 so technically she is due at her convenience

## 2019-11-07 NOTE — TELEPHONE ENCOUNTER
Message sent to Dr Andrea for when pt should have next c/s.  Pt had c/s in 2014 per [facility] and had benign bx and a hyperplastic polyp.

## 2019-11-07 NOTE — TELEPHONE ENCOUNTER
----- Message from Cherelle Ch Rep sent at 11/7/2019 10:58 AM EST -----  Regarding: colonoscopy  Contact: [phone]  Ashley from Dr Sal office is calling and needs to know when pt needs another colonoscopy. [phone]

## 2019-11-07 NOTE — TELEPHONE ENCOUNTER
Called Dr Sal's office and spoke with Ashley and advised per Dr Andrea that she has her last c/s in 2014 and had a hyperplastic polyp in the transverse colon and she should have next c/s in 5 yrs. So pt is due now .  She verb understanding and open access paperwork mailed to pt.

## 2020-02-05 ENCOUNTER — PREP FOR SURGERY (OUTPATIENT)
Dept: OTHER | Facility: HOSPITAL | Age: 45
End: 2020-02-05

## 2020-02-05 DIAGNOSIS — K63.5 POLYP OF COLON, UNSPECIFIED PART OF COLON, UNSPECIFIED TYPE: Primary | ICD-10-CM

## 2020-02-13 PROBLEM — K63.5 POLYP OF COLON: Status: ACTIVE | Noted: 2020-02-13

## 2020-03-22 ENCOUNTER — TELEPHONE (OUTPATIENT)
Dept: INTERNAL MEDICINE | Facility: CLINIC | Age: 45
End: 2020-03-22

## 2020-03-22 NOTE — TELEPHONE ENCOUNTER
Patient calls with complaint of chest pressure.  Symptoms are constant.  No SOA, fever, cough is associated.  A walk today made her symptoms better.  Discussed going to ER today to get checked out.  She declines.  She wants to wait to see Dr. Sal tomorrow.

## 2020-10-28 DIAGNOSIS — Z00.00 HEALTHCARE MAINTENANCE: Primary | ICD-10-CM

## 2020-11-06 LAB
ALBUMIN SERPL-MCNC: 4.8 G/DL (ref 3.5–5.2)
ALBUMIN/GLOB SERPL: 2.5 G/DL
ALP SERPL-CCNC: 48 U/L (ref 39–117)
ALT SERPL-CCNC: 18 U/L (ref 1–33)
APPEARANCE UR: CLEAR
AST SERPL-CCNC: 18 U/L (ref 1–32)
BACTERIA #/AREA URNS HPF: NORMAL /HPF
BASOPHILS # BLD AUTO: 0.04 10*3/MM3 (ref 0–0.2)
BASOPHILS NFR BLD AUTO: 0.8 % (ref 0–1.5)
BILIRUB SERPL-MCNC: 0.7 MG/DL (ref 0–1.2)
BILIRUB UR QL STRIP: NEGATIVE
BUN SERPL-MCNC: 11 MG/DL (ref 6–20)
BUN/CREAT SERPL: 14.1 (ref 7–25)
CALCIUM SERPL-MCNC: 9.1 MG/DL (ref 8.6–10.5)
CASTS URNS MICRO: NORMAL
CHLORIDE SERPL-SCNC: 101 MMOL/L (ref 98–107)
CHOLEST SERPL-MCNC: 187 MG/DL (ref 0–200)
CO2 SERPL-SCNC: 23.8 MMOL/L (ref 22–29)
COLOR UR: YELLOW
CREAT SERPL-MCNC: 0.78 MG/DL (ref 0.57–1)
EOSINOPHIL # BLD AUTO: 0.11 10*3/MM3 (ref 0–0.4)
EOSINOPHIL NFR BLD AUTO: 2.1 % (ref 0.3–6.2)
EPI CELLS #/AREA URNS HPF: NORMAL /HPF
ERYTHROCYTE [DISTWIDTH] IN BLOOD BY AUTOMATED COUNT: 11.5 % (ref 12.3–15.4)
GLOBULIN SER CALC-MCNC: 1.9 GM/DL
GLUCOSE SERPL-MCNC: 95 MG/DL (ref 65–99)
GLUCOSE UR QL: NEGATIVE
HCT VFR BLD AUTO: 40.6 % (ref 34–46.6)
HCV AB S/CO SERPL IA: <0.1 S/CO RATIO (ref 0–0.9)
HDLC SERPL-MCNC: 81 MG/DL (ref 40–60)
HGB BLD-MCNC: 14.1 G/DL (ref 12–15.9)
HGB UR QL STRIP: ABNORMAL
IMM GRANULOCYTES # BLD AUTO: 0.01 10*3/MM3 (ref 0–0.05)
IMM GRANULOCYTES NFR BLD AUTO: 0.2 % (ref 0–0.5)
KETONES UR QL STRIP: NEGATIVE
LDLC SERPL CALC-MCNC: 91 MG/DL (ref 0–100)
LEUKOCYTE ESTERASE UR QL STRIP: NEGATIVE
LYMPHOCYTES # BLD AUTO: 1.54 10*3/MM3 (ref 0.7–3.1)
LYMPHOCYTES NFR BLD AUTO: 29.6 % (ref 19.6–45.3)
MCH RBC QN AUTO: 30.4 PG (ref 26.6–33)
MCHC RBC AUTO-ENTMCNC: 34.7 G/DL (ref 31.5–35.7)
MCV RBC AUTO: 87.5 FL (ref 79–97)
MONOCYTES # BLD AUTO: 0.49 10*3/MM3 (ref 0.1–0.9)
MONOCYTES NFR BLD AUTO: 9.4 % (ref 5–12)
NEUTROPHILS # BLD AUTO: 3.01 10*3/MM3 (ref 1.7–7)
NEUTROPHILS NFR BLD AUTO: 57.9 % (ref 42.7–76)
NITRITE UR QL STRIP: NEGATIVE
NRBC BLD AUTO-RTO: 0 /100 WBC (ref 0–0.2)
PH UR STRIP: 8 [PH] (ref 5–8)
PLATELET # BLD AUTO: 243 10*3/MM3 (ref 140–450)
POTASSIUM SERPL-SCNC: 4.3 MMOL/L (ref 3.5–5.2)
PROT SERPL-MCNC: 6.7 G/DL (ref 6–8.5)
PROT UR QL STRIP: NEGATIVE
RBC # BLD AUTO: 4.64 10*6/MM3 (ref 3.77–5.28)
RBC #/AREA URNS HPF: NORMAL /HPF
SODIUM SERPL-SCNC: 136 MMOL/L (ref 136–145)
SP GR UR: ABNORMAL (ref 1–1.03)
TRIGL SERPL-MCNC: 84 MG/DL (ref 0–150)
TSH SERPL DL<=0.005 MIU/L-ACNC: 4.1 UIU/ML (ref 0.27–4.2)
UROBILINOGEN UR STRIP-MCNC: ABNORMAL MG/DL
VLDLC SERPL CALC-MCNC: 15 MG/DL (ref 5–40)
WBC # BLD AUTO: 5.2 10*3/MM3 (ref 3.4–10.8)
WBC #/AREA URNS HPF: NORMAL /HPF

## 2020-11-10 ENCOUNTER — OFFICE VISIT (OUTPATIENT)
Dept: INTERNAL MEDICINE | Facility: CLINIC | Age: 45
End: 2020-11-10

## 2020-11-10 ENCOUNTER — TELEPHONE (OUTPATIENT)
Dept: INTERNAL MEDICINE | Facility: CLINIC | Age: 45
End: 2020-11-10

## 2020-11-10 VITALS
DIASTOLIC BLOOD PRESSURE: 66 MMHG | HEART RATE: 74 BPM | WEIGHT: 117 LBS | SYSTOLIC BLOOD PRESSURE: 124 MMHG | HEIGHT: 65 IN | BODY MASS INDEX: 19.49 KG/M2

## 2020-11-10 DIAGNOSIS — Z00.00 HEALTHCARE MAINTENANCE: Primary | ICD-10-CM

## 2020-11-10 DIAGNOSIS — Z12.31 BREAST CANCER SCREENING BY MAMMOGRAM: ICD-10-CM

## 2020-11-10 DIAGNOSIS — E03.9 HYPOTHYROIDISM, UNSPECIFIED TYPE: ICD-10-CM

## 2020-11-10 PROCEDURE — 99396 PREV VISIT EST AGE 40-64: CPT | Performed by: INTERNAL MEDICINE

## 2020-11-10 NOTE — PROGRESS NOTES
"Subjective   CPE    Carmen Francois is a 45 y.o. female who presents for a complete physical exam. She is doing well today. Had routine labs. wnl w/ exception of ths which is slightly elevated at 4.1. she does report feeling \"always\" tired and \"always\" having dry skin. She rests well. She is walking dogs and hills regularly          Review of Systems   Constitutional: Positive for fatigue.   HENT: Negative.    Eyes: Negative.    Respiratory: Negative.    Cardiovascular: Negative.    Gastrointestinal: Negative.    Endocrine: Negative.    Genitourinary: Negative.    Musculoskeletal: Positive for arthralgias.   Skin: Negative.    Allergic/Immunologic: Negative.    Neurological: Negative.    Hematological: Negative.    Psychiatric/Behavioral: Negative.        The following portions of the patient's history were reviewed and updated as appropriate: allergies, current medications, past family history, past medical history, past social history, past surgical history and problem list.     Patient Active Problem List   Diagnosis   • Polyp of colon       Past Medical History:   Diagnosis Date   • History of EKG 2014   • Skin lesion        Past Surgical History:   Procedure Laterality Date   •  SECTION     • COLONOSCOPY  2014    Granular mucosa in the distal rectum, non-bleeding internal hemorrhoids       Family History   Problem Relation Age of Onset   • Depression Mother    • Lung cancer Paternal Grandmother    • Breast cancer Maternal Grandmother        Social History     Socioeconomic History   • Marital status:      Spouse name: Not on file   • Number of children: 2   • Years of education: Not on file   • Highest education level: Not on file   Tobacco Use   • Smoking status: Never Smoker   • Smokeless tobacco: Never Used   Substance and Sexual Activity   • Alcohol use: Yes     Comment: occasionally   • Drug use: No   • Sexual activity: Defer       Current Outpatient Medications on File Prior " "to Visit   Medication Sig Dispense Refill   • Multiple Vitamin (MULTIVITAMINS PO) Take  by mouth Daily.       No current facility-administered medications on file prior to visit.        No Known Allergies    Immunization History   Administered Date(s) Administered   • Flu Mist 10/15/2014   • Flu Vaccine Quad PF >18YRS 11/05/2018, 11/01/2020   • Hepatitis A 06/08/2018, 03/13/2019   • Tdap 10/01/2010, 11/22/2016       Objective     /66   Pulse 74   Ht 165.1 cm (65\")   Wt 53.1 kg (117 lb)   BMI 19.47 kg/m²     Physical Exam  Vitals signs and nursing note reviewed.   Constitutional:       Appearance: Normal appearance. She is well-developed.   HENT:      Head: Normocephalic and atraumatic.      Right Ear: Tympanic membrane and external ear normal.      Left Ear: Tympanic membrane and external ear normal.      Nose: Nose normal.      Mouth/Throat:      Mouth: Mucous membranes are moist.   Eyes:      Extraocular Movements: Extraocular movements intact.      Pupils: Pupils are equal, round, and reactive to light.   Neck:      Musculoskeletal: Neck supple.   Cardiovascular:      Rate and Rhythm: Normal rate and regular rhythm.      Heart sounds: Normal heart sounds.   Pulmonary:      Effort: Pulmonary effort is normal. No respiratory distress.      Breath sounds: Normal breath sounds.   Abdominal:      General: Abdomen is flat. Bowel sounds are normal.      Palpations: Abdomen is soft.   Musculoskeletal: Normal range of motion.   Skin:     General: Skin is warm and dry.   Neurological:      Mental Status: She is alert and oriented to person, place, and time.   Psychiatric:         Mood and Affect: Mood normal.         Behavior: Behavior normal.         Thought Content: Thought content normal.         Judgment: Judgment normal.         Assessment/Plan   Diagnoses and all orders for this visit:    1. Healthcare maintenance (Primary)    2. Hypothyroidism, unspecified type  -     TSH; Future  -     T4, Free; Future  - "     Thyroid Antibodies; Future    3. Breast cancer screening by mammogram  -     Mammo screening digital tomosynthesis bilateral w CAD; Future    Other orders  -     Cancel: Fluarix/Fluzone/Afluria Quad>6 Months        Discussion    Patient presents today for a CPE.  Patient follows a healthy diet.   Patient follows an adequate exercise regimen. Mammogram is up to date. Will schedule for this year.   Patient has been referred for colon cancer screening.   Pap smears are performed by the patient's gynecologist.   Immunizations are up to date.  She will have repeat thyroid testing in 8 weeks and will treat if needed.            No future appointments.

## 2020-11-10 NOTE — TELEPHONE ENCOUNTER
Patient is confused about her diagnosis that she got today for hypothyroidism. She does not know if she should go on medication now or wait. And if she should wait to get tested for it now or at a later date.    Please advise  [phone]

## 2020-12-21 LAB
T4 FREE SERPL-MCNC: 1.16 NG/DL (ref 0.93–1.7)
THYROGLOB AB SERPL-ACNC: <1 IU/ML (ref 0–0.9)
THYROPEROXIDASE AB SERPL-ACNC: <9 IU/ML (ref 0–34)
TSH SERPL DL<=0.005 MIU/L-ACNC: 2.65 UIU/ML (ref 0.27–4.2)

## 2020-12-22 ENCOUNTER — TELEPHONE (OUTPATIENT)
Dept: INTERNAL MEDICINE | Facility: CLINIC | Age: 45
End: 2020-12-22

## 2020-12-22 NOTE — TELEPHONE ENCOUNTER
Pt  Informed      ----- Message from Elena Sal MD sent at 12/22/2020  1:03 PM EST -----  Please advise patient that her thyroid testing is NORMAL

## 2021-01-11 ENCOUNTER — APPOINTMENT (OUTPATIENT)
Dept: WOMENS IMAGING | Facility: HOSPITAL | Age: 46
End: 2021-01-11

## 2021-01-11 PROCEDURE — 77063 BREAST TOMOSYNTHESIS BI: CPT | Performed by: RADIOLOGY

## 2021-01-11 PROCEDURE — 77067 SCR MAMMO BI INCL CAD: CPT | Performed by: RADIOLOGY

## 2021-04-06 ENCOUNTER — BULK ORDERING (OUTPATIENT)
Dept: CASE MANAGEMENT | Facility: OTHER | Age: 46
End: 2021-04-06

## 2021-04-06 DIAGNOSIS — Z23 IMMUNIZATION DUE: ICD-10-CM

## 2021-11-04 DIAGNOSIS — Z00.00 HEALTHCARE MAINTENANCE: Primary | ICD-10-CM

## 2021-11-11 LAB
ALBUMIN SERPL-MCNC: 4.8 G/DL (ref 3.8–4.8)
ALBUMIN/GLOB SERPL: 2 {RATIO} (ref 1.2–2.2)
ALP SERPL-CCNC: 62 IU/L (ref 44–121)
ALT SERPL-CCNC: 19 IU/L (ref 0–32)
APPEARANCE UR: CLEAR
AST SERPL-CCNC: 21 IU/L (ref 0–40)
BACTERIA #/AREA URNS HPF: NORMAL /[HPF]
BASOPHILS # BLD AUTO: 0 X10E3/UL (ref 0–0.2)
BASOPHILS NFR BLD AUTO: 1 %
BILIRUB SERPL-MCNC: 1 MG/DL (ref 0–1.2)
BILIRUB UR QL STRIP: NEGATIVE
BUN SERPL-MCNC: 10 MG/DL (ref 6–24)
BUN/CREAT SERPL: 12 (ref 9–23)
CALCIUM SERPL-MCNC: 9.8 MG/DL (ref 8.7–10.2)
CASTS URNS QL MICRO: NORMAL /LPF
CHLORIDE SERPL-SCNC: 98 MMOL/L (ref 96–106)
CHOLEST SERPL-MCNC: 237 MG/DL (ref 100–199)
CO2 SERPL-SCNC: 24 MMOL/L (ref 20–29)
COLOR UR: YELLOW
CREAT SERPL-MCNC: 0.83 MG/DL (ref 0.57–1)
EOSINOPHIL # BLD AUTO: 0.1 X10E3/UL (ref 0–0.4)
EOSINOPHIL NFR BLD AUTO: 2 %
EPI CELLS #/AREA URNS HPF: NORMAL /HPF (ref 0–10)
ERYTHROCYTE [DISTWIDTH] IN BLOOD BY AUTOMATED COUNT: 11.4 % (ref 11.7–15.4)
GLOBULIN SER CALC-MCNC: 2.4 G/DL (ref 1.5–4.5)
GLUCOSE SERPL-MCNC: 91 MG/DL (ref 65–99)
GLUCOSE UR QL: NEGATIVE
HCT VFR BLD AUTO: 41.9 % (ref 34–46.6)
HDLC SERPL-MCNC: 105 MG/DL
HGB BLD-MCNC: 14.7 G/DL (ref 11.1–15.9)
HGB UR QL STRIP: NEGATIVE
IMM GRANULOCYTES # BLD AUTO: 0 X10E3/UL (ref 0–0.1)
IMM GRANULOCYTES NFR BLD AUTO: 0 %
KETONES UR QL STRIP: NEGATIVE
LDLC SERPL CALC-MCNC: 117 MG/DL (ref 0–99)
LEUKOCYTE ESTERASE UR QL STRIP: NEGATIVE
LYMPHOCYTES # BLD AUTO: 1.8 X10E3/UL (ref 0.7–3.1)
LYMPHOCYTES NFR BLD AUTO: 33 %
MCH RBC QN AUTO: 31 PG (ref 26.6–33)
MCHC RBC AUTO-ENTMCNC: 35.1 G/DL (ref 31.5–35.7)
MCV RBC AUTO: 88 FL (ref 79–97)
MICRO URNS: NORMAL
MICRO URNS: NORMAL
MONOCYTES # BLD AUTO: 0.4 X10E3/UL (ref 0.1–0.9)
MONOCYTES NFR BLD AUTO: 8 %
NEUTROPHILS # BLD AUTO: 3.1 X10E3/UL (ref 1.4–7)
NEUTROPHILS NFR BLD AUTO: 56 %
NITRITE UR QL STRIP: NEGATIVE
PH UR STRIP: 6.5 [PH] (ref 5–7.5)
PLATELET # BLD AUTO: 253 X10E3/UL (ref 150–450)
POTASSIUM SERPL-SCNC: 4.4 MMOL/L (ref 3.5–5.2)
PROT SERPL-MCNC: 7.2 G/DL (ref 6–8.5)
PROT UR QL STRIP: NEGATIVE
RBC # BLD AUTO: 4.74 X10E6/UL (ref 3.77–5.28)
RBC #/AREA URNS HPF: NORMAL /HPF (ref 0–2)
SODIUM SERPL-SCNC: 138 MMOL/L (ref 134–144)
SP GR UR: 1.01 (ref 1–1.03)
TRIGL SERPL-MCNC: 89 MG/DL (ref 0–149)
TSH SERPL DL<=0.005 MIU/L-ACNC: 2.69 UIU/ML (ref 0.45–4.5)
UROBILINOGEN UR STRIP-MCNC: 0.2 MG/DL (ref 0.2–1)
VLDLC SERPL CALC-MCNC: 15 MG/DL (ref 5–40)
WBC # BLD AUTO: 5.5 X10E3/UL (ref 3.4–10.8)
WBC #/AREA URNS HPF: NORMAL /HPF (ref 0–5)

## 2021-11-15 ENCOUNTER — OFFICE VISIT (OUTPATIENT)
Dept: INTERNAL MEDICINE | Facility: CLINIC | Age: 46
End: 2021-11-15

## 2021-11-15 VITALS
HEART RATE: 86 BPM | DIASTOLIC BLOOD PRESSURE: 64 MMHG | BODY MASS INDEX: 19.49 KG/M2 | SYSTOLIC BLOOD PRESSURE: 96 MMHG | WEIGHT: 117 LBS | HEIGHT: 65 IN

## 2021-11-15 DIAGNOSIS — Z12.4 CERVICAL CANCER SCREENING: ICD-10-CM

## 2021-11-15 DIAGNOSIS — Z12.31 BREAST CANCER SCREENING BY MAMMOGRAM: ICD-10-CM

## 2021-11-15 DIAGNOSIS — N89.8 VAGINAL DISCHARGE: ICD-10-CM

## 2021-11-15 DIAGNOSIS — Z12.11 COLON CANCER SCREENING: Primary | ICD-10-CM

## 2021-11-15 PROCEDURE — 90471 IMMUNIZATION ADMIN: CPT | Performed by: INTERNAL MEDICINE

## 2021-11-15 PROCEDURE — 90686 IIV4 VACC NO PRSV 0.5 ML IM: CPT | Performed by: INTERNAL MEDICINE

## 2021-11-15 PROCEDURE — 99396 PREV VISIT EST AGE 40-64: CPT | Performed by: INTERNAL MEDICINE

## 2021-11-15 NOTE — PROGRESS NOTES
Subjective   CPE    Carmen Francois is a 46 y.o. female who presents for a complete physical exam.  She is doing well today with no complaints. Physically active w/ hour hikes w/ dogs daily. She is eating a nutritious diet. She is noting mild vaginal itching and discharge. Has been more pronounced since switching from cotton to synthetic underwear.         Review of Systems   Constitutional: Negative.    HENT: Negative.    Eyes: Negative.         Annual eye examinations   Respiratory: Negative.    Cardiovascular: Negative.    Gastrointestinal: Negative.    Endocrine: Negative.    Genitourinary: Negative.    Musculoskeletal: Negative.    Skin: Negative.         Annual skin examinations     Allergic/Immunologic: Negative.    Neurological: Negative.    Hematological: Negative.    Psychiatric/Behavioral: Negative.        The following portions of the patient's history were reviewed and updated as appropriate: allergies, current medications, past family history, past medical history, past social history, past surgical history and problem list.     Patient Active Problem List   Diagnosis   • Polyp of colon       Past Medical History:   Diagnosis Date   • History of EKG 2014   • Skin lesion        Past Surgical History:   Procedure Laterality Date   •  SECTION     • COLONOSCOPY  2014    Granular mucosa in the distal rectum, non-bleeding internal hemorrhoids       Family History   Problem Relation Age of Onset   • Depression Mother    • Lung cancer Paternal Grandmother    • Breast cancer Maternal Grandmother        Social History     Socioeconomic History   • Marital status:    • Number of children: 2   Tobacco Use   • Smoking status: Never Smoker   • Smokeless tobacco: Never Used   Vaping Use   • Vaping Use: Never used   Substance and Sexual Activity   • Alcohol use: Yes     Comment: occasionally   • Drug use: No   • Sexual activity: Defer       Current Outpatient Medications on File Prior to  "Visit   Medication Sig Dispense Refill   • Multiple Vitamin (MULTIVITAMINS PO) Take  by mouth Daily.       No current facility-administered medications on file prior to visit.       No Known Allergies    Immunization History   Administered Date(s) Administered   • COVID-19 (PFIZER) 03/01/2021, 04/01/2021   • Flu Vaccine Quad PF >18YRS 11/05/2018, 11/01/2020   • FluLaval/Fluarix/Fluzone >6 11/15/2021   • FluMist 2-49yrs 10/15/2014   • Hepatitis A 06/08/2018, 03/13/2019   • Tdap 10/01/2010, 11/22/2016       Objective     BP 96/64   Pulse 86   Ht 165.1 cm (65\")   Wt 53.1 kg (117 lb)   BMI 19.47 kg/m²     Physical Exam  Constitutional:       Appearance: Normal appearance. She is well-developed.   HENT:      Head: Normocephalic and atraumatic.      Right Ear: Hearing, tympanic membrane and external ear normal.      Left Ear: Hearing, tympanic membrane and external ear normal.      Nose: Nose normal.      Mouth/Throat:      Mouth: Mucous membranes are moist.   Eyes:      Extraocular Movements: Extraocular movements intact.      Pupils: Pupils are equal, round, and reactive to light.   Neck:      Thyroid: No thyromegaly.   Cardiovascular:      Rate and Rhythm: Normal rate and regular rhythm.      Heart sounds: Normal heart sounds. No murmur heard.      Pulmonary:      Effort: Pulmonary effort is normal.      Breath sounds: Normal breath sounds.   Chest:   Breasts:      Right: No mass.      Left: No mass.       Abdominal:      General: Abdomen is flat. There is no distension.      Palpations: Abdomen is soft.      Tenderness: There is no abdominal tenderness.   Genitourinary:     General: Normal vulva.      Vagina: Vaginal discharge present.      Rectum: Normal.   Musculoskeletal:      Cervical back: Normal range of motion and neck supple.   Lymphadenopathy:      Cervical: No cervical adenopathy.   Skin:     General: Skin is warm and dry.   Neurological:      General: No focal deficit present.      Mental Status: She is " alert and oriented to person, place, and time.   Psychiatric:         Mood and Affect: Mood normal.         Speech: Speech normal.         Behavior: Behavior normal.         Thought Content: Thought content normal.         Judgment: Judgment normal.         Assessment/Plan   Diagnoses and all orders for this visit:    1. Colon cancer screening (Primary)  -     Ambulatory Referral For Screening Colonoscopy    2. Breast cancer screening by mammogram  -     Mammo Screening Bilateral With CAD; Future    3. Cervical cancer screening  -     Cancel: IGP, Aptima HPV, Rfx 16 / 18,45; Future  -     IGP, Aptima HPV, Rfx 16 / 18,45    4. Vaginal discharge  -     NuSwab Vaginitis (VG) - , Cervix    Other orders  -     Cancel: FluLaval/Fluarix/Fluzone >6 Months ([id number])  -     FluLaval/Fluarix/Fluzone >6 Months ([id number])  -     terconazole (TERAZOL 3) 0.8 % vaginal cream; Insert 1 applicator into the vagina Every Night.  Dispense: 20 g; Refill: 2        Discussion    Patient presents today for a CPE.  Patient follows a healthy diet.   Patient follows an adequate exercise regimen. Mammogram is up to date.   Patient has been referred for colon cancer screening.   Pap smear obtained today. Immunizations are up to date.  Has vaginal discharge. Will empyrically treat w/ terazol and test a vaginosis panel. She will switch to cotton undergarments.            No future appointments.

## 2021-11-18 LAB
A VAGINAE DNA VAG QL NAA+PROBE: NORMAL SCORE
BVAB2 DNA VAG QL NAA+PROBE: NORMAL SCORE
C ALBICANS DNA VAG QL NAA+PROBE: NEGATIVE
C GLABRATA DNA VAG QL NAA+PROBE: NEGATIVE
MEGA1 DNA VAG QL NAA+PROBE: NORMAL SCORE
T VAGINALIS DNA VAG QL NAA+PROBE: NEGATIVE

## 2021-11-19 LAB
CYTOLOGIST CVX/VAG CYTO: NORMAL
CYTOLOGY CVX/VAG DOC CYTO: NORMAL
CYTOLOGY CVX/VAG DOC THIN PREP: NORMAL
DX ICD CODE: NORMAL
HIV 1 & 2 AB SER-IMP: NORMAL
HPV I/H RISK 4 DNA CVX QL PROBE+SIG AMP: NEGATIVE
OTHER STN SPEC: NORMAL
STAT OF ADQ CVX/VAG CYTO-IMP: NORMAL

## 2022-01-12 ENCOUNTER — HOSPITAL ENCOUNTER (OUTPATIENT)
Dept: MAMMOGRAPHY | Facility: HOSPITAL | Age: 47
Discharge: HOME | End: 2022-01-12
Admitting: INTERNAL MEDICINE

## 2022-01-12 DIAGNOSIS — Z12.31 BREAST CANCER SCREENING BY MAMMOGRAM: ICD-10-CM

## 2022-01-12 PROCEDURE — 77067 SCR MAMMO BI INCL CAD: CPT

## 2022-01-12 PROCEDURE — 77063 BREAST TOMOSYNTHESIS BI: CPT

## 2022-11-23 DIAGNOSIS — Z00.00 HEALTHCARE MAINTENANCE: Primary | ICD-10-CM

## 2022-11-29 LAB
ALBUMIN SERPL-MCNC: 4.4 G/DL (ref 3.5–5.2)
ALBUMIN/GLOB SERPL: 1.8 G/DL
ALP SERPL-CCNC: 79 U/L (ref 39–117)
ALT SERPL-CCNC: 15 U/L (ref 1–33)
APPEARANCE UR: CLEAR
AST SERPL-CCNC: 17 U/L (ref 1–32)
BACTERIA #/AREA URNS HPF: NORMAL /HPF
BASOPHILS # BLD AUTO: 0.07 10*3/MM3 (ref 0–0.2)
BASOPHILS NFR BLD AUTO: 1.1 % (ref 0–1.5)
BILIRUB SERPL-MCNC: 0.5 MG/DL (ref 0–1.2)
BILIRUB UR QL STRIP: NEGATIVE
BUN SERPL-MCNC: 10 MG/DL (ref 6–20)
BUN/CREAT SERPL: 12.7 (ref 7–25)
CALCIUM SERPL-MCNC: 9.5 MG/DL (ref 8.6–10.5)
CASTS URNS QL MICRO: NORMAL /LPF
CHLORIDE SERPL-SCNC: 103 MMOL/L (ref 98–107)
CHOLEST SERPL-MCNC: 202 MG/DL (ref 0–200)
CO2 SERPL-SCNC: 28.9 MMOL/L (ref 22–29)
COLOR UR: YELLOW
CREAT SERPL-MCNC: 0.79 MG/DL (ref 0.57–1)
EGFRCR SERPLBLD CKD-EPI 2021: 92.4 ML/MIN/1.73
EOSINOPHIL # BLD AUTO: 0.15 10*3/MM3 (ref 0–0.4)
EOSINOPHIL NFR BLD AUTO: 2.4 % (ref 0.3–6.2)
EPI CELLS #/AREA URNS HPF: NORMAL /HPF (ref 0–10)
ERYTHROCYTE [DISTWIDTH] IN BLOOD BY AUTOMATED COUNT: 11.8 % (ref 12.3–15.4)
GLOBULIN SER CALC-MCNC: 2.4 GM/DL
GLUCOSE SERPL-MCNC: 81 MG/DL (ref 65–99)
GLUCOSE UR QL STRIP: NEGATIVE
HCT VFR BLD AUTO: 41.1 % (ref 34–46.6)
HDLC SERPL-MCNC: 72 MG/DL (ref 40–60)
HGB BLD-MCNC: 13.9 G/DL (ref 12–15.9)
HGB UR QL STRIP: NEGATIVE
IMM GRANULOCYTES # BLD AUTO: 0.01 10*3/MM3 (ref 0–0.05)
IMM GRANULOCYTES NFR BLD AUTO: 0.2 % (ref 0–0.5)
KETONES UR QL STRIP: NEGATIVE
LDLC SERPL CALC-MCNC: 106 MG/DL (ref 0–100)
LEUKOCYTE ESTERASE UR QL STRIP: NEGATIVE
LYMPHOCYTES # BLD AUTO: 2.02 10*3/MM3 (ref 0.7–3.1)
LYMPHOCYTES NFR BLD AUTO: 32.6 % (ref 19.6–45.3)
MCH RBC QN AUTO: 29.9 PG (ref 26.6–33)
MCHC RBC AUTO-ENTMCNC: 33.8 G/DL (ref 31.5–35.7)
MCV RBC AUTO: 88.4 FL (ref 79–97)
MICRO URNS: ABNORMAL
MICRO URNS: ABNORMAL
MONOCYTES # BLD AUTO: 0.59 10*3/MM3 (ref 0.1–0.9)
MONOCYTES NFR BLD AUTO: 9.5 % (ref 5–12)
NEUTROPHILS # BLD AUTO: 3.36 10*3/MM3 (ref 1.7–7)
NEUTROPHILS NFR BLD AUTO: 54.2 % (ref 42.7–76)
NITRITE UR QL STRIP: NEGATIVE
NRBC BLD AUTO-RTO: 0 /100 WBC (ref 0–0.2)
PH UR STRIP: 7 [PH] (ref 5–7.5)
PLATELET # BLD AUTO: 299 10*3/MM3 (ref 140–450)
POTASSIUM SERPL-SCNC: 4.5 MMOL/L (ref 3.5–5.2)
PROT SERPL-MCNC: 6.8 G/DL (ref 6–8.5)
PROT UR QL STRIP: NEGATIVE
RBC # BLD AUTO: 4.65 10*6/MM3 (ref 3.77–5.28)
RBC #/AREA URNS HPF: NORMAL /HPF (ref 0–2)
SODIUM SERPL-SCNC: 141 MMOL/L (ref 136–145)
SP GR UR STRIP: <=1.005 (ref 1–1.03)
TRIGL SERPL-MCNC: 141 MG/DL (ref 0–150)
TSH SERPL DL<=0.005 MIU/L-ACNC: 3.28 UIU/ML (ref 0.27–4.2)
URINALYSIS REFLEX: ABNORMAL
UROBILINOGEN UR STRIP-MCNC: 0.2 MG/DL (ref 0.2–1)
VLDLC SERPL CALC-MCNC: 24 MG/DL (ref 5–40)
WBC # BLD AUTO: 6.2 10*3/MM3 (ref 3.4–10.8)
WBC #/AREA URNS HPF: NORMAL /HPF (ref 0–5)

## 2022-12-09 ENCOUNTER — TELEPHONE (OUTPATIENT)
Dept: GASTROENTEROLOGY | Facility: CLINIC | Age: 47
End: 2022-12-09

## 2022-12-09 ENCOUNTER — OFFICE VISIT (OUTPATIENT)
Dept: INTERNAL MEDICINE | Facility: CLINIC | Age: 47
End: 2022-12-09

## 2022-12-09 VITALS
SYSTOLIC BLOOD PRESSURE: 106 MMHG | WEIGHT: 117 LBS | DIASTOLIC BLOOD PRESSURE: 76 MMHG | BODY MASS INDEX: 19.49 KG/M2 | HEART RATE: 72 BPM | HEIGHT: 65 IN

## 2022-12-09 DIAGNOSIS — Z12.31 BREAST CANCER SCREENING BY MAMMOGRAM: ICD-10-CM

## 2022-12-09 DIAGNOSIS — N91.2 AMENORRHEA: ICD-10-CM

## 2022-12-09 DIAGNOSIS — Z00.00 HEALTHCARE MAINTENANCE: Primary | ICD-10-CM

## 2022-12-09 DIAGNOSIS — Z12.11 COLON CANCER SCREENING: ICD-10-CM

## 2022-12-09 PROCEDURE — 99396 PREV VISIT EST AGE 40-64: CPT | Performed by: INTERNAL MEDICINE

## 2022-12-09 PROCEDURE — 90686 IIV4 VACC NO PRSV 0.5 ML IM: CPT | Performed by: INTERNAL MEDICINE

## 2022-12-09 PROCEDURE — 90471 IMMUNIZATION ADMIN: CPT | Performed by: INTERNAL MEDICINE

## 2022-12-09 NOTE — PROGRESS NOTES
Subjective   CPE    Carmen Francois is a 47 y.o. female who presents for a complete physical exam.  She is doing well today. She notes that she has only had 1-2 periods in the last one year. No additional menopausal symptoms. She has a healthy lifestyle w/ routine fitness and nutritious diet.       Review of Systems   Constitutional: Negative.    HENT: Negative.    Eyes: Negative.    Respiratory: Negative.    Cardiovascular: Negative.    Gastrointestinal: Negative.    Endocrine: Negative.    Genitourinary: Negative.    Musculoskeletal: Negative.    Skin: Negative.    Allergic/Immunologic: Negative.    Neurological: Negative.    Hematological: Negative.    Psychiatric/Behavioral: Negative.        The following portions of the patient's history were reviewed and updated as appropriate: allergies, current medications, past family history, past medical history, past social history, past surgical history and problem list.     Patient Active Problem List   Diagnosis   • Polyp of colon       Past Medical History:   Diagnosis Date   • History of EKG 2014   • Skin lesion        Past Surgical History:   Procedure Laterality Date   •  SECTION     • COLONOSCOPY  2014    Granular mucosa in the distal rectum, non-bleeding internal hemorrhoids       Family History   Problem Relation Age of Onset   • Depression Mother    • Lung cancer Paternal Grandmother    • Breast cancer Maternal Grandmother        Social History     Socioeconomic History   • Marital status:    • Number of children: 2   Tobacco Use   • Smoking status: Never   • Smokeless tobacco: Never   Vaping Use   • Vaping Use: Never used   Substance and Sexual Activity   • Alcohol use: Yes     Comment: occasionally   • Drug use: No   • Sexual activity: Defer       Current Outpatient Medications on File Prior to Visit   Medication Sig Dispense Refill   • Multiple Vitamin (MULTIVITAMINS PO) Take  by mouth Daily.     • [DISCONTINUED] terconazole  "(TERAZOL 3) 0.8 % vaginal cream Insert 1 applicator into the vagina Every Night. 20 g 2     No current facility-administered medications on file prior to visit.       No Known Allergies    Immunization History   Administered Date(s) Administered   • COVID-19 (PFIZER) PURPLE CAP 04/13/2021, 11/26/2021   • FluLaval/Fluzone >6mos 11/15/2021, 12/09/2022   • FluMist 2-49yrs 10/15/2014   • Fluzone Quad >6mos (Multi-dose) 11/05/2018, 11/01/2020   • Hepatitis A 06/08/2018, 03/13/2019   • Tdap 10/01/2010, 11/22/2016       Objective     /76   Pulse 72   Ht 163.8 cm (64.5\")   Wt 53.1 kg (117 lb)   BMI 19.77 kg/m²     Physical Exam  Vitals and nursing note reviewed.   Constitutional:       Appearance: Normal appearance. She is well-developed.   HENT:      Head: Normocephalic and atraumatic.      Right Ear: Tympanic membrane and external ear normal.      Left Ear: Tympanic membrane and external ear normal.      Nose: Nose normal.      Mouth/Throat:      Mouth: Mucous membranes are moist.   Eyes:      Extraocular Movements: Extraocular movements intact.      Pupils: Pupils are equal, round, and reactive to light.   Cardiovascular:      Rate and Rhythm: Normal rate and regular rhythm.      Pulses: Normal pulses.      Heart sounds: Normal heart sounds.   Pulmonary:      Effort: Pulmonary effort is normal. No respiratory distress.      Breath sounds: Normal breath sounds.   Abdominal:      General: Abdomen is flat.      Palpations: Abdomen is soft.   Musculoskeletal:         General: Normal range of motion.      Cervical back: Normal range of motion and neck supple.   Skin:     General: Skin is warm and dry.   Neurological:      General: No focal deficit present.      Mental Status: She is alert and oriented to person, place, and time.   Psychiatric:         Mood and Affect: Mood normal.         Behavior: Behavior normal.         Thought Content: Thought content normal.         Judgment: Judgment normal.         Assessment " & Plan   Diagnoses and all orders for this visit:    1. Healthcare maintenance (Primary)    2. Colon cancer screening  -     Ambulatory Referral For Screening Colonoscopy    3. Breast cancer screening by mammogram  -     Mammo screening digital tomosynthesis bilateral w CAD; Future    Other orders  -     FluLaval/Fluzone >6 mos ([id number])  -     Cancel: FluLaval/Fluzone >6 mos ([id number])        Discussion    Patient presents today for a CPE.  Patient follows a healthy diet.   Patient follows an adequate exercise regimen. Patient will schedule a mammogram. Patient has been referred for colon cancer screening.   Pap smear performed every 3 years.  She will get derm and eye examinations. She will have homrone testing to determine if menopausal. She will get a screening colonoscopy and mammogram. She was given flu vac today and advised a covid 19 booster 90 days after most recent infection. F/u one year or prn.               No future appointments.

## 2022-12-09 NOTE — TELEPHONE ENCOUNTER
Caller: Carmen Francois    Relationship: Self    Best call back number:[phone]    What is the best time to reach you: ANYTIME    Who are you requesting to speak with (clinical staff, provider,  specific staff member): CLINICAL STAFF    What was the call regarding: PATIENT WOULD LIKE TO SCHEDULE PROCEDURE. WOULD LIKE FOR SOMEONE IN OFFICE TO CALL AND ASSIST WITH SCHEDULING.    Do you require a callback: YES

## 2022-12-10 LAB
FSH SERPL-ACNC: 138 MIU/ML
LH SERPL-ACNC: 61.6 MIU/ML

## 2022-12-12 ENCOUNTER — PRE-PROCEDURE SCREENING (OUTPATIENT)
Dept: GASTROENTEROLOGY | Facility: CLINIC | Age: 47
End: 2022-12-12

## 2022-12-12 ENCOUNTER — TELEPHONE (OUTPATIENT)
Dept: INTERNAL MEDICINE | Facility: CLINIC | Age: 47
End: 2022-12-12

## 2022-12-12 NOTE — TELEPHONE ENCOUNTER
----- Message from Elena Sal MD sent at 12/12/2022  1:25 PM EST -----  Please advise patient that hormone levels are in the menopausal range. Can evaluate if she has any bleeding in the future.   erich

## 2022-12-12 NOTE — TELEPHONE ENCOUNTER
Mailed OA questionnaire to patient to complete for screening. Will defer for 30days and notify referring physician if not received

## 2023-01-18 ENCOUNTER — TELEPHONE (OUTPATIENT)
Dept: GASTROENTEROLOGY | Facility: CLINIC | Age: 48
End: 2023-01-18

## 2023-01-18 NOTE — TELEPHONE ENCOUNTER
Caller: Carmen Francois    Relationship to patient: Self    Best call back number: [phone]    Patient is needing:     PT IS CALLING TO VERIFY IF HER QUESTIONNAIRE FOR COLONOSCOPY WAS RECEIVED. SHE IS ALSO WANTING SOMEONE TO CALL HER BACK ASAP REGARDING SCHED PROCEDURE.

## 2023-01-20 ENCOUNTER — PREP FOR SURGERY (OUTPATIENT)
Dept: OTHER | Facility: HOSPITAL | Age: 48
End: 2023-01-20
Payer: COMMERCIAL

## 2023-01-20 DIAGNOSIS — Z12.11 ENCOUNTER FOR SCREENING FOR MALIGNANT NEOPLASM OF COLON: Primary | ICD-10-CM

## 2023-01-23 ENCOUNTER — TELEPHONE (OUTPATIENT)
Dept: INTERNAL MEDICINE | Facility: CLINIC | Age: 48
End: 2023-01-23
Payer: COMMERCIAL

## 2023-01-23 DIAGNOSIS — Z78.0 MENOPAUSE: Primary | ICD-10-CM

## 2023-01-23 DIAGNOSIS — Z12.11 COLON CANCER SCREENING: Primary | ICD-10-CM

## 2023-01-23 NOTE — TELEPHONE ENCOUNTER
Patient was wanting to go over labs from December. She has a few questions about them. Also, she stated she is having problems getting her colonoscopy scheduled. Says she cannot get in contact with that office.

## 2023-01-25 ENCOUNTER — APPOINTMENT (OUTPATIENT)
Dept: WOMENS IMAGING | Facility: HOSPITAL | Age: 48
End: 2023-01-25
Payer: COMMERCIAL

## 2023-01-25 ENCOUNTER — RESEARCH ENCOUNTER (OUTPATIENT)
Dept: OTHER | Facility: OTHER | Age: 48
End: 2023-01-25
Payer: COMMERCIAL

## 2023-01-25 PROCEDURE — 77063 BREAST TOMOSYNTHESIS BI: CPT | Performed by: RADIOLOGY

## 2023-01-25 PROCEDURE — 77067 SCR MAMMO BI INCL CAD: CPT | Performed by: RADIOLOGY

## 2023-01-25 NOTE — RESEARCH
Informed Consent worksheet for the protocol: [employer] 20-08 Prospective Case Collection Study for [employer]    Consent version: IRB approval dated 08/31/2022  Protocol version: 7.0   Date:28-April 2022, IRB approval 5/27/2022  Subject -[id number]      The patient was pre screened by phone call and identified as a candidate for the [employer] 20-08 Prospective Case Collection Study for [employer], and voiced interest in participating in the study.    The following people were present at the consent conference: Location at the [facility]  Patient: Carmen Francois   : Renetta Billy RN     The following was discussed with the patient prior to signing the informed consent.    • The study purpose   • Procedures, including identification of an experimental investigational device  • Duration of study participation  • New Information  • Risks and Discomforts  • Any known side effects when applicable    • Alternative treatment  • Benefits   • Costs/Payment   • Patient's accountability and responsibilities    • The voluntary nature of research participants     • Right to withdraw consent    • The withdrawal process    • Confidentiality   • Authorization to use and disclose information for research purposes - Including who can view information and the types of information shared.    The patient was given opportunity for questions. The patient verbalizes understanding and is willing to sign the informed consent.     A copy of the signed main informed consent form were given to the patient.    No study-specific procedures were completed prior to patient signing study informed consent form(s)    The   is aware of the subject's participation status.    Renetta Billy RN   Clinical Research Coordinator

## 2023-03-03 ENCOUNTER — OUTSIDE FACILITY SERVICE (OUTPATIENT)
Dept: GASTROENTEROLOGY | Facility: CLINIC | Age: 48
End: 2023-03-03
Payer: COMMERCIAL

## 2023-03-03 PROCEDURE — 45378 DIAGNOSTIC COLONOSCOPY: CPT | Performed by: INTERNAL MEDICINE

## 2023-03-15 ENCOUNTER — TELEPHONE (OUTPATIENT)
Dept: INTERNAL MEDICINE | Facility: CLINIC | Age: 48
End: 2023-03-15

## 2023-03-15 NOTE — TELEPHONE ENCOUNTER
Caller: Carmen Francois    Relationship: Self    Best call back number: [phone]    Who are you requesting to speak with (clinical staff, provider,  specific staff member): CLINICAL STAFF    What was the call regarding: PATIENT CALLED REGARDING HER RECENT LABS FOR MENOPAUSE. ANOTHER PROVIDER, YENI WATSON AT [facility], IS ASKING IF AN ESTROGEN TEST WAS PERFORMED.    Do you require a callback: YES

## 2023-11-28 ENCOUNTER — OFFICE VISIT (OUTPATIENT)
Dept: INTERNAL MEDICINE | Facility: CLINIC | Age: 48
End: 2023-11-28
Payer: COMMERCIAL

## 2023-11-28 VITALS
SYSTOLIC BLOOD PRESSURE: 104 MMHG | HEIGHT: 64 IN | OXYGEN SATURATION: 98 % | WEIGHT: 120 LBS | BODY MASS INDEX: 20.49 KG/M2 | DIASTOLIC BLOOD PRESSURE: 80 MMHG | HEART RATE: 76 BPM

## 2023-11-28 DIAGNOSIS — R07.89 ATYPICAL CHEST PAIN: Primary | ICD-10-CM

## 2023-11-28 DIAGNOSIS — J06.9 VIRAL UPPER RESPIRATORY TRACT INFECTION: ICD-10-CM

## 2023-11-28 DIAGNOSIS — R07.89 COSTOCHONDRAL CHEST PAIN: ICD-10-CM

## 2023-11-28 PROCEDURE — 99213 OFFICE O/P EST LOW 20 MIN: CPT | Performed by: INTERNAL MEDICINE

## 2023-11-28 RX ORDER — MELATONIN
1000 DAILY
COMMUNITY

## 2023-11-28 RX ORDER — BACILLUS COAGULANS/INULIN 1B-250 MG
CAPSULE ORAL
COMMUNITY

## 2023-11-28 NOTE — PROGRESS NOTES
"Subjective     Carmen Francois is a 48 y.o. female who presents with   Chief Complaint   Patient presents with    Chest Pain       History of Present Illness     Right sided chest pain.  No SOA.  Hurts to move.  Hurts at night.  Walking dogs on leash more that are both greater than 100 pounds.      Mild cough and congestion going on a couple days.  No fever.  Mild sore throat.    Review of Systems   Constitutional:  Negative for fever.   Respiratory:  Positive for cough. Negative for chest tightness and shortness of breath.    Cardiovascular:  Positive for chest pain.       The following portions of the patient's history were reviewed and updated as appropriate: allergies, current medications and problem list.    Patient Active Problem List    Diagnosis Date Noted    Polyp of colon 02/13/2020     Note Last Updated: 2/13/2020     Added automatically from request for surgery [id number]         Current Outpatient Medications on File Prior to Visit   Medication Sig Dispense Refill    Bacillus Coagulans-Inulin (Probiotic) 1-250 BILLION-MG capsule       cholecalciferol (Vitamin D) 25 MCG (1000 UT) tablet Take 1 tablet by mouth Daily.      Multiple Vitamin (MULTIVITAMINS PO) Take  by mouth Daily.       No current facility-administered medications on file prior to visit.       Objective     /80   Pulse 76   Ht 163.8 cm (64.49\")   Wt 54.4 kg (120 lb)   SpO2 98%   BMI 20.29 kg/m²     Physical Exam  Constitutional:       Appearance: She is well-developed.   HENT:      Head: Normocephalic and atraumatic.      Right Ear: Hearing and tympanic membrane normal.      Left Ear: Hearing and tympanic membrane normal.      Mouth/Throat:      Pharynx: No oropharyngeal exudate or posterior oropharyngeal erythema.   Cardiovascular:      Rate and Rhythm: Normal rate and regular rhythm.      Heart sounds: Normal heart sounds.   Pulmonary:      Effort: Pulmonary effort is normal.      Breath sounds: Normal breath sounds.   Chest: "   Breasts:     Right: Normal.      Left: Normal.   Musculoskeletal:      Comments: Tender between rib and sternum on the right.     Skin:     General: Skin is warm and dry.   Neurological:      Mental Status: She is alert and oriented to person, place, and time.   Psychiatric:         Behavior: Behavior normal.         Assessment & Plan   Diagnoses and all orders for this visit:    1. Atypical chest pain (Primary)    2. Costochondral chest pain    3. Viral upper respiratory tract infection        Discussion    Patient presents with atypical chest pain likely secondary to costochondritis.  I discussed with the patient a trial of conservative management with:   NSAIDs.  Let me know if not feeling better over the next week or if there is any change in symptoms.  Order for CXR given to get at [facility].   Viral URI.  She is flu and COVID negative today in the office.  Symptomatic management is recommended.           Future Appointments   Date Time Provider Department Center   1/5/2024  8:30 AM [facility]   1/9/2024 11:15 AM Elena Sal MD [facility]

## 2023-12-01 ENCOUNTER — TELEPHONE (OUTPATIENT)
Dept: INTERNAL MEDICINE | Facility: CLINIC | Age: 48
End: 2023-12-01
Payer: COMMERCIAL

## 2023-12-14 DIAGNOSIS — Z00.00 HEALTHCARE MAINTENANCE: Primary | ICD-10-CM

## 2023-12-22 LAB
ALBUMIN SERPL-MCNC: 4.7 G/DL (ref 3.5–5.2)
ALBUMIN/GLOB SERPL: 2.4 G/DL
ALP SERPL-CCNC: 61 U/L (ref 39–117)
ALT SERPL-CCNC: 16 U/L (ref 1–33)
APPEARANCE UR: CLEAR
AST SERPL-CCNC: 15 U/L (ref 1–32)
BACTERIA #/AREA URNS HPF: ABNORMAL /HPF
BACTERIA UR CULT: NORMAL
BACTERIA UR CULT: NORMAL
BASOPHILS # BLD AUTO: 0.05 10*3/MM3 (ref 0–0.2)
BASOPHILS NFR BLD AUTO: 0.9 % (ref 0–1.5)
BILIRUB SERPL-MCNC: 0.8 MG/DL (ref 0–1.2)
BILIRUB UR QL STRIP: NEGATIVE
BUN SERPL-MCNC: 15 MG/DL (ref 6–20)
BUN/CREAT SERPL: 19.2 (ref 7–25)
CALCIUM SERPL-MCNC: 9.5 MG/DL (ref 8.6–10.5)
CASTS URNS QL MICRO: ABNORMAL /LPF
CHLORIDE SERPL-SCNC: 101 MMOL/L (ref 98–107)
CHOLEST SERPL-MCNC: 196 MG/DL (ref 0–200)
CO2 SERPL-SCNC: 26.7 MMOL/L (ref 22–29)
COLOR UR: YELLOW
CREAT SERPL-MCNC: 0.78 MG/DL (ref 0.57–1)
EGFRCR SERPLBLD CKD-EPI 2021: 93.2 ML/MIN/1.73
EOSINOPHIL # BLD AUTO: 0.09 10*3/MM3 (ref 0–0.4)
EOSINOPHIL NFR BLD AUTO: 1.7 % (ref 0.3–6.2)
EPI CELLS #/AREA URNS HPF: ABNORMAL /HPF (ref 0–10)
ERYTHROCYTE [DISTWIDTH] IN BLOOD BY AUTOMATED COUNT: 11.6 % (ref 12.3–15.4)
GLOBULIN SER CALC-MCNC: 2 GM/DL
GLUCOSE SERPL-MCNC: 87 MG/DL (ref 65–99)
GLUCOSE UR QL STRIP: NEGATIVE
HCT VFR BLD AUTO: 40 % (ref 34–46.6)
HDLC SERPL-MCNC: 88 MG/DL (ref 40–60)
HGB BLD-MCNC: 13.8 G/DL (ref 12–15.9)
HGB UR QL STRIP: NEGATIVE
IMM GRANULOCYTES # BLD AUTO: 0.01 10*3/MM3 (ref 0–0.05)
IMM GRANULOCYTES NFR BLD AUTO: 0.2 % (ref 0–0.5)
KETONES UR QL STRIP: NEGATIVE
LDLC SERPL CALC-MCNC: 98 MG/DL (ref 0–100)
LEUKOCYTE ESTERASE UR QL STRIP: ABNORMAL
LYMPHOCYTES # BLD AUTO: 1.7 10*3/MM3 (ref 0.7–3.1)
LYMPHOCYTES NFR BLD AUTO: 32.1 % (ref 19.6–45.3)
MCH RBC QN AUTO: 30.5 PG (ref 26.6–33)
MCHC RBC AUTO-ENTMCNC: 34.5 G/DL (ref 31.5–35.7)
MCV RBC AUTO: 88.5 FL (ref 79–97)
MICRO URNS: ABNORMAL
MONOCYTES # BLD AUTO: 0.32 10*3/MM3 (ref 0.1–0.9)
MONOCYTES NFR BLD AUTO: 6 % (ref 5–12)
NEUTROPHILS # BLD AUTO: 3.12 10*3/MM3 (ref 1.7–7)
NEUTROPHILS NFR BLD AUTO: 59.1 % (ref 42.7–76)
NITRITE UR QL STRIP: NEGATIVE
NRBC BLD AUTO-RTO: 0 /100 WBC (ref 0–0.2)
PH UR STRIP: 6.5 [PH] (ref 5–7.5)
PLATELET # BLD AUTO: 254 10*3/MM3 (ref 140–450)
POTASSIUM SERPL-SCNC: 4.2 MMOL/L (ref 3.5–5.2)
PROT SERPL-MCNC: 6.7 G/DL (ref 6–8.5)
PROT UR QL STRIP: NEGATIVE
RBC # BLD AUTO: 4.52 10*6/MM3 (ref 3.77–5.28)
RBC #/AREA URNS HPF: ABNORMAL /HPF (ref 0–2)
SODIUM SERPL-SCNC: 138 MMOL/L (ref 136–145)
SP GR UR STRIP: 1 (ref 1–1.03)
TRIGL SERPL-MCNC: 55 MG/DL (ref 0–150)
TSH SERPL DL<=0.005 MIU/L-ACNC: 2.48 UIU/ML (ref 0.27–4.2)
URINALYSIS REFLEX: ABNORMAL
UROBILINOGEN UR STRIP-MCNC: 0.2 MG/DL (ref 0.2–1)
VLDLC SERPL CALC-MCNC: 10 MG/DL (ref 5–40)
WBC # BLD AUTO: 5.29 10*3/MM3 (ref 3.4–10.8)
WBC #/AREA URNS HPF: ABNORMAL /HPF (ref 0–5)

## 2024-01-09 ENCOUNTER — OFFICE VISIT (OUTPATIENT)
Dept: INTERNAL MEDICINE | Facility: CLINIC | Age: 49
End: 2024-01-09
Payer: COMMERCIAL

## 2024-01-09 VITALS
HEIGHT: 65 IN | WEIGHT: 119 LBS | BODY MASS INDEX: 19.83 KG/M2 | SYSTOLIC BLOOD PRESSURE: 104 MMHG | DIASTOLIC BLOOD PRESSURE: 72 MMHG | HEART RATE: 61 BPM | OXYGEN SATURATION: 98 %

## 2024-01-09 DIAGNOSIS — Z00.00 HEALTHCARE MAINTENANCE: Primary | ICD-10-CM

## 2024-01-09 DIAGNOSIS — Z12.31 BREAST CANCER SCREENING BY MAMMOGRAM: ICD-10-CM

## 2024-01-09 PROCEDURE — 99396 PREV VISIT EST AGE 40-64: CPT | Performed by: INTERNAL MEDICINE

## 2024-01-09 RX ORDER — MULTIVIT-MIN/IRON/FOLIC ACID/K 18-600-40
2000 CAPSULE ORAL DAILY
Qty: 90 CAPSULE | Refills: 3 | Status: SHIPPED | OUTPATIENT
Start: 2024-01-09

## 2024-01-09 NOTE — PROGRESS NOTES
Subjective   CPE    Carmen Francois is a 48 y.o. female who presents for a complete physical exam.  She is doing well today. She has 11 and 12 y/o boys engaged in lacrosse. Stays very busy w/ this. She notes that she was experiencing anterior chest pain but has noted that this was related to walking dogs on a leash. Now she has a walk where they can be leash free and does not experience any chest pain.         Review of Systems   Constitutional: Negative.    HENT: Negative.     Eyes: Negative.    Respiratory: Negative.     Cardiovascular: Negative.    Gastrointestinal: Negative.    Endocrine: Negative.    Genitourinary: Negative.    Musculoskeletal: Negative.    Skin: Negative.    Allergic/Immunologic: Negative.    Neurological: Negative.    Hematological: Negative.    Psychiatric/Behavioral: Negative.         The following portions of the patient's history were reviewed and updated as appropriate: allergies, current medications, past family history, past medical history, past social history, past surgical history, and problem list.     Patient Active Problem List   Diagnosis    Polyp of colon       Past Medical History:   Diagnosis Date    History of EKG 2014    Skin lesion        Past Surgical History:   Procedure Laterality Date     SECTION      COLONOSCOPY  2014    Granular mucosa in the distal rectum, non-bleeding internal hemorrhoids       Family History   Problem Relation Age of Onset    Depression Mother     Cancer Mother     Lung cancer Paternal Grandmother     Breast cancer Maternal Grandmother        Social History     Socioeconomic History    Marital status:     Number of children: 2   Tobacco Use    Smoking status: Never    Smokeless tobacco: Never   Vaping Use    Vaping Use: Never used   Substance and Sexual Activity    Alcohol use: Not Currently     Alcohol/week: 3.0 standard drinks of alcohol     Types: 3 Glasses of wine per week     Comment: occasionally    Drug use: No  "   Sexual activity: Yes     Partners: Male     Birth control/protection: Vasectomy       Current Outpatient Medications on File Prior to Visit   Medication Sig Dispense Refill    Bacillus Coagulans-Inulin (Probiotic) 1-250 BILLION-MG capsule       cholecalciferol (Vitamin D) 25 MCG (1000 UT) tablet Take 1 tablet by mouth Daily.      Multiple Vitamin (MULTIVITAMINS PO) Take  by mouth Daily.       No current facility-administered medications on file prior to visit.       No Known Allergies    Immunization History   Administered Date(s) Administered    COVID-19 (PFIZER) Purple Cap Monovalent 03/23/2021, 04/13/2021, 11/26/2021    COVID-19 F23 (PFIZER) 12YRS+ (COMIRNATY) 10/26/2023    FluMist 2-49yrs 10/15/2014    Fluzone (or Fluarix & Flulaval for VFC) >6mos 10/30/2019, 08/20/2020, 11/15/2021, 12/09/2022    Fluzone Quad >6mos (Multi-dose) 11/05/2018, 11/01/2020    Hepatitis A 06/08/2018, 03/13/2019    Influenza Injectable Mdck Pf Quad 10/26/2023    Influenza Quad Vaccine (Inpatient) 01/11/2018, 10/01/2019    Tdap 10/01/2010, 11/22/2016       Objective     /72   Pulse 61   Ht 163.8 cm (64.5\")   Wt 54 kg (119 lb)   SpO2 98%   BMI 20.11 kg/m²     Physical Exam  Vitals and nursing note reviewed.   Constitutional:       Appearance: Normal appearance. She is well-developed.   HENT:      Head: Normocephalic and atraumatic.      Right Ear: Tympanic membrane and external ear normal.      Left Ear: Tympanic membrane and external ear normal.      Nose: Nose normal.      Mouth/Throat:      Mouth: Mucous membranes are moist.   Eyes:      Extraocular Movements: Extraocular movements intact.      Pupils: Pupils are equal, round, and reactive to light.   Cardiovascular:      Rate and Rhythm: Normal rate and regular rhythm.      Pulses: Normal pulses.      Heart sounds: Normal heart sounds.   Pulmonary:      Effort: Pulmonary effort is normal. No respiratory distress.      Breath sounds: Normal breath sounds.   Abdominal:     "  General: Abdomen is flat.      Palpations: Abdomen is soft.   Musculoskeletal:         General: Normal range of motion.      Cervical back: Normal range of motion and neck supple.   Skin:     General: Skin is warm and dry.   Neurological:      General: No focal deficit present.      Mental Status: She is alert and oriented to person, place, and time.   Psychiatric:         Mood and Affect: Mood normal.         Behavior: Behavior normal.         Thought Content: Thought content normal.         Judgment: Judgment normal.       Assessment & Plan   Diagnoses and all orders for this visit:    1. Healthcare maintenance (Primary)    2. Breast cancer screening by mammogram  -     Mammo screening digital tomosynthesis bilateral w CAD; Future    Other orders  -     Cholecalciferol (Vitamin D) 50 MCG (2000 UT) capsule; Take 1 capsule by mouth Daily.  Dispense: 90 capsule; Refill: 3        Discussion    Patient presents today for a CPE.  Continue to emphasize healthy nutrtion and routine activity. Patient is encouraged intake 2000 iu vitamin d3 daily for supplementation. She is to continue to hydrate well and get adequate sleep.    Patient follows an adequate exercise regimen. Mammogram is up to date.   Colon cancer screening is up to date.   Pap smears are performed by the patient's gynecologist.   Immunizations are up to date.  Caution w/ dog walking given prior costochondritis from this. Labs reviewed. Patient will f/u in one year or prn.            No future appointments.

## 2024-01-26 ENCOUNTER — APPOINTMENT (OUTPATIENT)
Dept: WOMENS IMAGING | Facility: HOSPITAL | Age: 49
End: 2024-01-26
Payer: COMMERCIAL

## 2024-01-26 PROCEDURE — 77063 BREAST TOMOSYNTHESIS BI: CPT | Performed by: RADIOLOGY

## 2024-01-26 PROCEDURE — 77067 SCR MAMMO BI INCL CAD: CPT | Performed by: RADIOLOGY

## 2024-12-31 DIAGNOSIS — Z00.00 HEALTHCARE MAINTENANCE: Primary | ICD-10-CM

## 2025-01-12 LAB
ALBUMIN SERPL-MCNC: 4.2 G/DL (ref 3.5–5.2)
ALBUMIN/GLOB SERPL: 1.8 G/DL
ALP SERPL-CCNC: 70 U/L (ref 39–117)
ALT SERPL-CCNC: 16 U/L (ref 1–33)
APPEARANCE UR: CLEAR
AST SERPL-CCNC: 16 U/L (ref 1–32)
BACTERIA #/AREA URNS HPF: NORMAL /[HPF]
BACTERIA UR CULT: NORMAL
BACTERIA UR CULT: NORMAL
BASOPHILS # BLD AUTO: 0.04 10*3/MM3 (ref 0–0.2)
BASOPHILS NFR BLD AUTO: 0.8 % (ref 0–1.5)
BILIRUB SERPL-MCNC: 0.8 MG/DL (ref 0–1.2)
BILIRUB UR QL STRIP: NEGATIVE
BUN SERPL-MCNC: 14 MG/DL (ref 6–20)
BUN/CREAT SERPL: 14.9 (ref 7–25)
CALCIUM SERPL-MCNC: 9.5 MG/DL (ref 8.6–10.5)
CASTS URNS QL MICRO: NORMAL /LPF
CHLORIDE SERPL-SCNC: 101 MMOL/L (ref 98–107)
CHOLEST SERPL-MCNC: 226 MG/DL (ref 0–200)
CO2 SERPL-SCNC: 26.5 MMOL/L (ref 22–29)
COLOR UR: YELLOW
CREAT SERPL-MCNC: 0.94 MG/DL (ref 0.57–1)
EGFRCR SERPLBLD CKD-EPI 2021: 74.1 ML/MIN/1.73
EOSINOPHIL # BLD AUTO: 0.19 10*3/MM3 (ref 0–0.4)
EOSINOPHIL NFR BLD AUTO: 3.8 % (ref 0.3–6.2)
EPI CELLS #/AREA URNS HPF: NORMAL /HPF (ref 0–10)
ERYTHROCYTE [DISTWIDTH] IN BLOOD BY AUTOMATED COUNT: 11.7 % (ref 12.3–15.4)
GLOBULIN SER CALC-MCNC: 2.4 GM/DL
GLUCOSE SERPL-MCNC: 101 MG/DL (ref 65–99)
GLUCOSE UR QL STRIP: NEGATIVE
HCT VFR BLD AUTO: 41.5 % (ref 34–46.6)
HDLC SERPL-MCNC: 101 MG/DL (ref 40–60)
HGB BLD-MCNC: 14.3 G/DL (ref 12–15.9)
HGB UR QL STRIP: NEGATIVE
IMM GRANULOCYTES # BLD AUTO: 0.01 10*3/MM3 (ref 0–0.05)
IMM GRANULOCYTES NFR BLD AUTO: 0.2 % (ref 0–0.5)
KETONES UR QL STRIP: NEGATIVE
LDLC SERPL CALC-MCNC: 112 MG/DL (ref 0–100)
LEUKOCYTE ESTERASE UR QL STRIP: ABNORMAL
LYMPHOCYTES # BLD AUTO: 1.96 10*3/MM3 (ref 0.7–3.1)
LYMPHOCYTES NFR BLD AUTO: 39.1 % (ref 19.6–45.3)
MCH RBC QN AUTO: 30.6 PG (ref 26.6–33)
MCHC RBC AUTO-ENTMCNC: 34.5 G/DL (ref 31.5–35.7)
MCV RBC AUTO: 88.7 FL (ref 79–97)
MICRO URNS: ABNORMAL
MONOCYTES # BLD AUTO: 0.36 10*3/MM3 (ref 0.1–0.9)
MONOCYTES NFR BLD AUTO: 7.2 % (ref 5–12)
NEUTROPHILS # BLD AUTO: 2.45 10*3/MM3 (ref 1.7–7)
NEUTROPHILS NFR BLD AUTO: 48.9 % (ref 42.7–76)
NITRITE UR QL STRIP: NEGATIVE
NRBC BLD AUTO-RTO: 0 /100 WBC (ref 0–0.2)
PH UR STRIP: 6.5 [PH] (ref 5–7.5)
PLATELET # BLD AUTO: 254 10*3/MM3 (ref 140–450)
POTASSIUM SERPL-SCNC: 4.4 MMOL/L (ref 3.5–5.2)
PROT SERPL-MCNC: 6.6 G/DL (ref 6–8.5)
PROT UR QL STRIP: NEGATIVE
RBC # BLD AUTO: 4.68 10*6/MM3 (ref 3.77–5.28)
RBC #/AREA URNS HPF: NORMAL /HPF (ref 0–2)
SODIUM SERPL-SCNC: 137 MMOL/L (ref 136–145)
SP GR UR STRIP: 1.01 (ref 1–1.03)
TRIGL SERPL-MCNC: 78 MG/DL (ref 0–150)
TSH SERPL DL<=0.005 MIU/L-ACNC: 3.7 UIU/ML (ref 0.27–4.2)
URINALYSIS REFLEX: ABNORMAL
UROBILINOGEN UR STRIP-MCNC: 0.2 MG/DL (ref 0.2–1)
VLDLC SERPL CALC-MCNC: 13 MG/DL (ref 5–40)
WBC # BLD AUTO: 5.01 10*3/MM3 (ref 3.4–10.8)
WBC #/AREA URNS HPF: NORMAL /HPF (ref 0–5)

## 2025-01-13 ENCOUNTER — OFFICE VISIT (OUTPATIENT)
Dept: INTERNAL MEDICINE | Facility: CLINIC | Age: 50
End: 2025-01-13
Payer: COMMERCIAL

## 2025-01-13 VITALS
HEIGHT: 65 IN | WEIGHT: 119 LBS | HEART RATE: 76 BPM | SYSTOLIC BLOOD PRESSURE: 106 MMHG | DIASTOLIC BLOOD PRESSURE: 70 MMHG | OXYGEN SATURATION: 98 % | BODY MASS INDEX: 19.83 KG/M2

## 2025-01-13 DIAGNOSIS — Z00.00 HEALTHCARE MAINTENANCE: Primary | ICD-10-CM

## 2025-01-13 DIAGNOSIS — Z23 NEED FOR VACCINATION: ICD-10-CM

## 2025-01-13 PROCEDURE — 90471 IMMUNIZATION ADMIN: CPT | Performed by: INTERNAL MEDICINE

## 2025-01-13 PROCEDURE — 99396 PREV VISIT EST AGE 40-64: CPT | Performed by: INTERNAL MEDICINE

## 2025-01-13 PROCEDURE — 90656 IIV3 VACC NO PRSV 0.5 ML IM: CPT | Performed by: INTERNAL MEDICINE

## 2025-01-13 NOTE — PROGRESS NOTES
Subjective   CPE    Carmen Francois is a 49 y.o. female who presents for a complete physical exam.  She is doing well today. She reports she is engaged in healthy movement and nutrtion. She notes healthy mental and physical health at this time.           Review of Systems   Constitutional: Negative.    HENT: Negative.     Eyes: Negative.    Respiratory: Negative.     Cardiovascular: Negative.    Gastrointestinal: Negative.    Endocrine: Negative.    Genitourinary: Negative.    Musculoskeletal: Negative.    Skin: Negative.    Allergic/Immunologic: Negative.    Neurological: Negative.    Hematological: Negative.    Psychiatric/Behavioral: Negative.         The following portions of the patient's history were reviewed and updated as appropriate: allergies, current medications, past family history, past medical history, past social history, past surgical history, and problem list.     Patient Active Problem List   Diagnosis    Polyp of colon       Past Medical History:   Diagnosis Date    History of EKG 2014    Skin lesion        Past Surgical History:   Procedure Laterality Date     SECTION      COLONOSCOPY  2014    Granular mucosa in the distal rectum, non-bleeding internal hemorrhoids       Family History   Problem Relation Age of Onset    Depression Mother     Cancer Mother     Lung cancer Paternal Grandmother     Breast cancer Maternal Grandmother        Social History     Socioeconomic History    Marital status:     Number of children: 2   Tobacco Use    Smoking status: Never    Smokeless tobacco: Never   Vaping Use    Vaping status: Never Used   Substance and Sexual Activity    Alcohol use: Not Currently     Alcohol/week: 3.0 standard drinks of alcohol     Types: 3 Glasses of wine per week     Comment: occasionally    Drug use: No    Sexual activity: Yes     Partners: Male     Birth control/protection: Vasectomy       Current Outpatient Medications on File Prior to Visit   Medication  "Sig Dispense Refill    Bacillus Coagulans-Inulin (Probiotic) 1-250 BILLION-MG capsule       Cholecalciferol (Vitamin D) 50 MCG (2000 UT) capsule Take 1 capsule by mouth Daily. 90 capsule 3    Multiple Vitamin (MULTIVITAMINS PO) Take  by mouth Daily.       No current facility-administered medications on file prior to visit.       No Known Allergies    Immunization History   Administered Date(s) Administered    COVID-19 (PFIZER) 12YRS+ (COMIRNATY) 10/26/2023    COVID-19 (PFIZER) Purple Cap Monovalent 03/23/2021, 04/13/2021, 11/26/2021    FluMist 2-49yrs 10/15/2014    Fluzone  >6mos 01/11/2018, 10/01/2019    Fluzone (or Fluarix & Flulaval for VFC) >6mos 10/30/2019, 08/20/2020, 11/15/2021, 12/09/2022    Fluzone Quad >6mos (Multi-dose) 11/05/2018, 11/01/2020    Hepatitis A 06/08/2018, 03/13/2019    Influenza Injectable Mdck Pf Quad 10/26/2023    Tdap 10/01/2010, 11/22/2016       Objective     /70   Pulse 76   Ht 165.1 cm (65\")   Wt 54 kg (119 lb)   SpO2 98%   BMI 19.80 kg/m²     Physical Exam  Vitals and nursing note reviewed.   Constitutional:       Appearance: Normal appearance. She is well-developed.   HENT:      Head: Normocephalic and atraumatic.      Right Ear: Tympanic membrane and external ear normal.      Left Ear: Tympanic membrane and external ear normal.      Nose: Nose normal.      Mouth/Throat:      Mouth: Mucous membranes are moist.   Eyes:      Extraocular Movements: Extraocular movements intact.      Pupils: Pupils are equal, round, and reactive to light.   Cardiovascular:      Rate and Rhythm: Normal rate and regular rhythm.      Pulses: Normal pulses.      Heart sounds: Normal heart sounds.   Pulmonary:      Effort: Pulmonary effort is normal. No respiratory distress.      Breath sounds: Normal breath sounds.   Abdominal:      General: Abdomen is flat.      Palpations: Abdomen is soft.   Musculoskeletal:         General: Normal range of motion.      Cervical back: Normal range of motion and " neck supple.   Skin:     General: Skin is warm and dry.   Neurological:      General: No focal deficit present.      Mental Status: She is alert and oriented to person, place, and time.   Psychiatric:         Mood and Affect: Mood normal.         Behavior: Behavior normal.         Thought Content: Thought content normal.         Judgment: Judgment normal.         Assessment & Plan   Diagnoses and all orders for this visit:    1. Healthcare maintenance (Primary)    2. Need for vaccination  -     Fluzone >6mos ([id number])        Discussion    Patient presents today for a CPE.  Patient follows a healthy diet.   Patient follows an adequate exercise regimen. Mammogram is up to date.   Colon cancer screening is up to date.   Pap smears are performed by the patient's gynecologist.   Immunizations are up to date.   Discussed importance of preventative care including vaccinations, age appropriate cancer screening, routine lab work, healthy diet, and active lifestyle.flu vaccine given today. Patient encouraged to continue healthy lifestyle. U/a tsh, lipid, cmp, and cbc reviewed w patient today.          No future appointments.

## 2025-03-11 ENCOUNTER — APPOINTMENT (OUTPATIENT)
Dept: WOMENS IMAGING | Facility: HOSPITAL | Age: 50
End: 2025-03-11
Payer: COMMERCIAL

## 2025-03-11 PROCEDURE — 77067 SCR MAMMO BI INCL CAD: CPT | Performed by: RADIOLOGY

## 2025-03-11 PROCEDURE — 77063 BREAST TOMOSYNTHESIS BI: CPT | Performed by: RADIOLOGY

## 2025-03-16 ENCOUNTER — RESULTS FOLLOW-UP (OUTPATIENT)
Dept: INTERNAL MEDICINE | Facility: CLINIC | Age: 50
End: 2025-03-16
Payer: COMMERCIAL

## 2025-03-16 DIAGNOSIS — R92.333 HETEROGENEOUSLY DENSE TISSUE OF BOTH BREASTS ON MAMMOGRAPHY: ICD-10-CM

## 2025-03-16 DIAGNOSIS — Z12.39 ENCOUNTER FOR BREAST CANCER SCREENING USING NON-MAMMOGRAM MODALITY: Primary | ICD-10-CM

## 2025-03-17 DIAGNOSIS — R92.343 EXTREMELY DENSE TISSUE OF BOTH BREASTS ON MAMMOGRAPHY: ICD-10-CM

## 2025-03-17 DIAGNOSIS — Z12.39 ENCOUNTER FOR BREAST CANCER SCREENING USING NON-MAMMOGRAM MODALITY: Primary | ICD-10-CM

## 2025-03-17 NOTE — TELEPHONE ENCOUNTER
Patient has normal mammogram. She has heterogeneously dense breast which can obscure small mass. If she would like she can have additional imaging with ultrasound.     Pt informed  and would like additional images

## 2025-03-24 ENCOUNTER — APPOINTMENT (OUTPATIENT)
Dept: WOMENS IMAGING | Facility: HOSPITAL | Age: 50
End: 2025-03-24
Payer: COMMERCIAL

## 2025-03-24 PROCEDURE — 76641 ULTRASOUND BREAST COMPLETE: CPT | Performed by: RADIOLOGY

## 2025-03-27 DIAGNOSIS — R92.333 HETEROGENEOUSLY DENSE TISSUE OF BOTH BREASTS ON MAMMOGRAPHY: ICD-10-CM

## 2025-03-27 DIAGNOSIS — Z12.39 BREAST CANCER SCREENING OTHER THAN MAMMOGRAM: Primary | ICD-10-CM
